# Patient Record
Sex: MALE | Race: WHITE | NOT HISPANIC OR LATINO | Employment: PART TIME | URBAN - METROPOLITAN AREA
[De-identification: names, ages, dates, MRNs, and addresses within clinical notes are randomized per-mention and may not be internally consistent; named-entity substitution may affect disease eponyms.]

---

## 2021-12-05 ENCOUNTER — APPOINTMENT (OUTPATIENT)
Dept: GENERAL RADIOLOGY | Facility: HOSPITAL | Age: 26
End: 2021-12-05

## 2021-12-05 ENCOUNTER — HOSPITAL ENCOUNTER (INPATIENT)
Facility: HOSPITAL | Age: 26
LOS: 4 days | Discharge: HOME | End: 2021-12-09
Attending: EMERGENCY MEDICINE | Admitting: INTERNAL MEDICINE

## 2021-12-05 DIAGNOSIS — R26.2 DIFFICULTY WALKING: ICD-10-CM

## 2021-12-05 DIAGNOSIS — J96.01 ACUTE RESPIRATORY FAILURE WITH HYPOXIA (HCC): Primary | ICD-10-CM

## 2021-12-05 DIAGNOSIS — Z78.9 DECREASED ACTIVITIES OF DAILY LIVING (ADL): ICD-10-CM

## 2021-12-05 DIAGNOSIS — J12.82 PNEUMONIA DUE TO COVID-19 VIRUS: ICD-10-CM

## 2021-12-05 DIAGNOSIS — U07.1 PNEUMONIA DUE TO COVID-19 VIRUS: ICD-10-CM

## 2021-12-05 LAB
ALBUMIN SERPL-MCNC: 4.1 G/DL (ref 3.5–5.2)
ALBUMIN/GLOB SERPL: 1.2 G/DL
ALP SERPL-CCNC: 86 U/L (ref 39–117)
ALT SERPL W P-5'-P-CCNC: 77 U/L (ref 1–41)
ANION GAP SERPL CALCULATED.3IONS-SCNC: 13.6 MMOL/L (ref 5–15)
AST SERPL-CCNC: 130 U/L (ref 1–40)
BASE EXCESS BLDA CALC-SCNC: 1 MMOL/L (ref -2–2)
BASOPHILS # BLD AUTO: 0 10*3/MM3 (ref 0–0.2)
BASOPHILS NFR BLD AUTO: 0 % (ref 0–1.5)
BDY SITE: ABNORMAL
BILIRUB SERPL-MCNC: 1 MG/DL (ref 0–1.2)
BUN SERPL-MCNC: 18 MG/DL (ref 6–20)
BUN/CREAT SERPL: 14.8 (ref 7–25)
CALCIUM SPEC-SCNC: 9.1 MG/DL (ref 8.6–10.5)
CHLORIDE SERPL-SCNC: 95 MMOL/L (ref 98–107)
CO2 SERPL-SCNC: 26.4 MMOL/L (ref 22–29)
COHGB MFR BLD: 0.5 % (ref 0–1.5)
CREAT SERPL-MCNC: 1.22 MG/DL (ref 0.76–1.27)
CRP SERPL-MCNC: 3.51 MG/DL (ref 0–0.5)
D DIMER PPP FEU-MCNC: 0.73 MG/L (FEU) (ref 0–0.59)
D-LACTATE SERPL-SCNC: 1.8 MMOL/L (ref 0.5–2)
D-LACTATE SERPL-SCNC: 2.4 MMOL/L (ref 0.5–2)
DEPRECATED RDW RBC AUTO: 37.7 FL (ref 37–54)
EOSINOPHIL # BLD AUTO: 0 10*3/MM3 (ref 0–0.4)
EOSINOPHIL NFR BLD AUTO: 0 % (ref 0.3–6.2)
ERYTHROCYTE [DISTWIDTH] IN BLOOD BY AUTOMATED COUNT: 12.2 % (ref 12.3–15.4)
FERRITIN SERPL-MCNC: 3118 NG/ML (ref 30–400)
FHHB: 8.1 % (ref 0–5)
GFR SERPL CREATININE-BSD FRML MDRD: 71 ML/MIN/1.73
GLOBULIN UR ELPH-MCNC: 3.5 GM/DL
GLUCOSE SERPL-MCNC: 128 MG/DL (ref 65–99)
HCO3 BLDA-SCNC: 23 MMOL/L (ref 22–26)
HCT VFR BLD AUTO: 43.6 % (ref 37.5–51)
HGB BLD-MCNC: 14.9 G/DL (ref 13–17.7)
HGB BLDA-MCNC: 14.2 G/DL (ref 13.8–16.4)
HOLD SPECIMEN: NORMAL
HOLD SPECIMEN: NORMAL
IMM GRANULOCYTES # BLD AUTO: 0.01 10*3/MM3 (ref 0–0.05)
IMM GRANULOCYTES NFR BLD AUTO: 0.3 % (ref 0–0.5)
LACTATE BLDA-SCNC: ABNORMAL MMOL/L
LDH SERPL-CCNC: 501 U/L (ref 135–225)
LYMPHOCYTES # BLD AUTO: 0.53 10*3/MM3 (ref 0.7–3.1)
LYMPHOCYTES NFR BLD AUTO: 14.2 % (ref 19.6–45.3)
MCH RBC QN AUTO: 28.9 PG (ref 26.6–33)
MCHC RBC AUTO-ENTMCNC: 34.2 G/DL (ref 31.5–35.7)
MCV RBC AUTO: 84.7 FL (ref 79–97)
METHGB BLD QL: 0.3 % (ref 0–1.5)
MODALITY: ABNORMAL
MONOCYTES # BLD AUTO: 0.3 10*3/MM3 (ref 0.1–0.9)
MONOCYTES NFR BLD AUTO: 8 % (ref 5–12)
NEUTROPHILS NFR BLD AUTO: 2.89 10*3/MM3 (ref 1.7–7)
NEUTROPHILS NFR BLD AUTO: 77.5 % (ref 42.7–76)
NRBC BLD AUTO-RTO: 0 /100 WBC (ref 0–0.2)
NT-PROBNP SERPL-MCNC: 72 PG/ML (ref 0–450)
OXYHGB MFR BLDV: 91.1 % (ref 94–99)
PCO2 BLDA: 29.5 MM HG (ref 35–45)
PH BLDA: 7.51 PH UNITS (ref 7.35–7.45)
PLATELET # BLD AUTO: 172 10*3/MM3 (ref 140–450)
PMV BLD AUTO: 10.2 FL (ref 6–12)
PO2 BLDA: 59 MM HG (ref 80–100)
POTASSIUM SERPL-SCNC: 3.7 MMOL/L (ref 3.5–5.2)
PROCALCITONIN SERPL-MCNC: 0.22 NG/ML (ref 0–0.25)
PROT SERPL-MCNC: 7.6 G/DL (ref 6–8.5)
RBC # BLD AUTO: 5.15 10*6/MM3 (ref 4.14–5.8)
SAO2 % BLDCOA: 91.8 % (ref 95–99)
SODIUM SERPL-SCNC: 135 MMOL/L (ref 136–145)
TROPONIN T SERPL-MCNC: <0.01 NG/ML (ref 0–0.03)
WBC NRBC COR # BLD: 3.73 10*3/MM3 (ref 3.4–10.8)
WHOLE BLOOD HOLD SPECIMEN: NORMAL
WHOLE BLOOD HOLD SPECIMEN: NORMAL

## 2021-12-05 PROCEDURE — 36415 COLL VENOUS BLD VENIPUNCTURE: CPT | Performed by: EMERGENCY MEDICINE

## 2021-12-05 PROCEDURE — 25010000002 ENOXAPARIN PER 10 MG: Performed by: INTERNAL MEDICINE

## 2021-12-05 PROCEDURE — 82375 ASSAY CARBOXYHB QUANT: CPT | Performed by: EMERGENCY MEDICINE

## 2021-12-05 PROCEDURE — 84484 ASSAY OF TROPONIN QUANT: CPT | Performed by: EMERGENCY MEDICINE

## 2021-12-05 PROCEDURE — 82805 BLOOD GASES W/O2 SATURATION: CPT | Performed by: EMERGENCY MEDICINE

## 2021-12-05 PROCEDURE — 36600 WITHDRAWAL OF ARTERIAL BLOOD: CPT | Performed by: EMERGENCY MEDICINE

## 2021-12-05 PROCEDURE — 83615 LACTATE (LD) (LDH) ENZYME: CPT | Performed by: EMERGENCY MEDICINE

## 2021-12-05 PROCEDURE — 83050 HGB METHEMOGLOBIN QUAN: CPT | Performed by: EMERGENCY MEDICINE

## 2021-12-05 PROCEDURE — 93010 ELECTROCARDIOGRAM REPORT: CPT | Performed by: INTERNAL MEDICINE

## 2021-12-05 PROCEDURE — 99284 EMERGENCY DEPT VISIT MOD MDM: CPT

## 2021-12-05 PROCEDURE — 85379 FIBRIN DEGRADATION QUANT: CPT | Performed by: EMERGENCY MEDICINE

## 2021-12-05 PROCEDURE — 85025 COMPLETE CBC W/AUTO DIFF WBC: CPT | Performed by: EMERGENCY MEDICINE

## 2021-12-05 PROCEDURE — 93005 ELECTROCARDIOGRAM TRACING: CPT | Performed by: EMERGENCY MEDICINE

## 2021-12-05 PROCEDURE — 82728 ASSAY OF FERRITIN: CPT | Performed by: EMERGENCY MEDICINE

## 2021-12-05 PROCEDURE — 83605 ASSAY OF LACTIC ACID: CPT | Performed by: EMERGENCY MEDICINE

## 2021-12-05 PROCEDURE — 87040 BLOOD CULTURE FOR BACTERIA: CPT | Performed by: EMERGENCY MEDICINE

## 2021-12-05 PROCEDURE — 80053 COMPREHEN METABOLIC PANEL: CPT | Performed by: EMERGENCY MEDICINE

## 2021-12-05 PROCEDURE — 99223 1ST HOSP IP/OBS HIGH 75: CPT | Performed by: INTERNAL MEDICINE

## 2021-12-05 PROCEDURE — XW033E5 INTRODUCTION OF REMDESIVIR ANTI-INFECTIVE INTO PERIPHERAL VEIN, PERCUTANEOUS APPROACH, NEW TECHNOLOGY GROUP 5: ICD-10-PCS | Performed by: STUDENT IN AN ORGANIZED HEALTH CARE EDUCATION/TRAINING PROGRAM

## 2021-12-05 PROCEDURE — 93005 ELECTROCARDIOGRAM TRACING: CPT

## 2021-12-05 PROCEDURE — 84145 PROCALCITONIN (PCT): CPT | Performed by: EMERGENCY MEDICINE

## 2021-12-05 PROCEDURE — 71045 X-RAY EXAM CHEST 1 VIEW: CPT

## 2021-12-05 PROCEDURE — 86140 C-REACTIVE PROTEIN: CPT | Performed by: EMERGENCY MEDICINE

## 2021-12-05 PROCEDURE — 83880 ASSAY OF NATRIURETIC PEPTIDE: CPT | Performed by: EMERGENCY MEDICINE

## 2021-12-05 RX ORDER — SODIUM CHLORIDE 0.9 % (FLUSH) 0.9 %
10 SYRINGE (ML) INJECTION EVERY 12 HOURS SCHEDULED
Status: DISCONTINUED | OUTPATIENT
Start: 2021-12-05 | End: 2021-12-09 | Stop reason: HOSPADM

## 2021-12-05 RX ORDER — DEXAMETHASONE 4 MG/1
6 TABLET ORAL DAILY
Status: DISCONTINUED | OUTPATIENT
Start: 2021-12-06 | End: 2021-12-08

## 2021-12-05 RX ORDER — FLUTICASONE PROPIONATE 50 MCG
2 SPRAY, SUSPENSION (ML) NASAL DAILY
COMMUNITY

## 2021-12-05 RX ORDER — NITROGLYCERIN 0.4 MG/1
0.4 TABLET SUBLINGUAL
Status: DISCONTINUED | OUTPATIENT
Start: 2021-12-05 | End: 2021-12-09 | Stop reason: HOSPADM

## 2021-12-05 RX ORDER — SODIUM CHLORIDE 0.9 % (FLUSH) 0.9 %
10 SYRINGE (ML) INJECTION AS NEEDED
Status: DISCONTINUED | OUTPATIENT
Start: 2021-12-05 | End: 2021-12-09 | Stop reason: HOSPADM

## 2021-12-05 RX ORDER — TOPIRAMATE 25 MG/1
25 TABLET ORAL NIGHTLY
COMMUNITY

## 2021-12-05 RX ORDER — ACETAMINOPHEN 325 MG/1
650 TABLET ORAL EVERY 4 HOURS PRN
Status: DISCONTINUED | OUTPATIENT
Start: 2021-12-05 | End: 2021-12-09 | Stop reason: HOSPADM

## 2021-12-05 RX ORDER — LISINOPRIL 10 MG/1
10 TABLET ORAL
Status: DISCONTINUED | OUTPATIENT
Start: 2021-12-06 | End: 2021-12-09 | Stop reason: HOSPADM

## 2021-12-05 RX ORDER — ONDANSETRON 2 MG/ML
4 INJECTION INTRAMUSCULAR; INTRAVENOUS EVERY 6 HOURS PRN
Status: DISCONTINUED | OUTPATIENT
Start: 2021-12-05 | End: 2021-12-09 | Stop reason: HOSPADM

## 2021-12-05 RX ORDER — ONDANSETRON 4 MG/1
4 TABLET, FILM COATED ORAL EVERY 6 HOURS PRN
Status: DISCONTINUED | OUTPATIENT
Start: 2021-12-05 | End: 2021-12-09 | Stop reason: HOSPADM

## 2021-12-05 RX ORDER — IPRATROPIUM BROMIDE AND ALBUTEROL SULFATE 2.5; .5 MG/3ML; MG/3ML
3 SOLUTION RESPIRATORY (INHALATION)
Status: DISCONTINUED | OUTPATIENT
Start: 2021-12-05 | End: 2021-12-06

## 2021-12-05 RX ORDER — LISINOPRIL 10 MG/1
10 TABLET ORAL NIGHTLY
COMMUNITY

## 2021-12-05 RX ORDER — METHYLPREDNISOLONE 4 MG/1
TABLET ORAL
COMMUNITY
Start: 2021-11-30 | End: 2021-12-09 | Stop reason: HOSPADM

## 2021-12-05 RX ORDER — BENZONATATE 200 MG/1
200 CAPSULE ORAL 3 TIMES DAILY PRN
COMMUNITY

## 2021-12-05 RX ORDER — DEXAMETHASONE SODIUM PHOSPHATE 10 MG/ML
6 INJECTION INTRAMUSCULAR; INTRAVENOUS DAILY
Status: DISCONTINUED | OUTPATIENT
Start: 2021-12-06 | End: 2021-12-09 | Stop reason: HOSPADM

## 2021-12-05 RX ORDER — AZITHROMYCIN 1 G
PACKET (EA) ORAL DAILY
Status: ON HOLD | COMMUNITY
Start: 2021-11-30 | End: 2021-12-06

## 2021-12-05 RX ADMIN — SODIUM CHLORIDE, PRESERVATIVE FREE 10 ML: 5 INJECTION INTRAVENOUS at 22:37

## 2021-12-05 RX ADMIN — ENOXAPARIN SODIUM 40 MG: 40 INJECTION SUBCUTANEOUS at 22:37

## 2021-12-05 RX ADMIN — REMDESIVIR 200 MG: 100 INJECTION, POWDER, LYOPHILIZED, FOR SOLUTION INTRAVENOUS at 22:38

## 2021-12-05 RX ADMIN — SODIUM CHLORIDE 1000 ML: 9 INJECTION, SOLUTION INTRAVENOUS at 18:15

## 2021-12-05 RX ADMIN — ACETAMINOPHEN 650 MG: 325 TABLET ORAL at 22:36

## 2021-12-06 ENCOUNTER — APPOINTMENT (OUTPATIENT)
Dept: CT IMAGING | Facility: HOSPITAL | Age: 26
End: 2021-12-06

## 2021-12-06 PROBLEM — R09.02 HYPOXIA: Status: ACTIVE | Noted: 2021-12-06

## 2021-12-06 PROBLEM — E66.9 OBESITY (BMI 30-39.9): Chronic | Status: ACTIVE | Noted: 2021-12-06

## 2021-12-06 LAB
ALBUMIN SERPL-MCNC: 3.4 G/DL (ref 3.5–5.2)
ALBUMIN/GLOB SERPL: 1 G/DL
ALP SERPL-CCNC: 82 U/L (ref 39–117)
ALT SERPL W P-5'-P-CCNC: 66 U/L (ref 1–41)
ANION GAP SERPL CALCULATED.3IONS-SCNC: 11.2 MMOL/L (ref 5–15)
AST SERPL-CCNC: 107 U/L (ref 1–40)
BILIRUB SERPL-MCNC: 0.9 MG/DL (ref 0–1.2)
BUN SERPL-MCNC: 15 MG/DL (ref 6–20)
BUN/CREAT SERPL: 17.4 (ref 7–25)
CALCIUM SPEC-SCNC: 8.6 MG/DL (ref 8.6–10.5)
CHLORIDE SERPL-SCNC: 99 MMOL/L (ref 98–107)
CO2 SERPL-SCNC: 22.8 MMOL/L (ref 22–29)
CREAT SERPL-MCNC: 0.86 MG/DL (ref 0.76–1.27)
DEPRECATED RDW RBC AUTO: 36.7 FL (ref 37–54)
ERYTHROCYTE [DISTWIDTH] IN BLOOD BY AUTOMATED COUNT: 12.2 % (ref 12.3–15.4)
GFR SERPL CREATININE-BSD FRML MDRD: 107 ML/MIN/1.73
GLOBULIN UR ELPH-MCNC: 3.4 GM/DL
GLUCOSE SERPL-MCNC: 108 MG/DL (ref 65–99)
HCT VFR BLD AUTO: 38.5 % (ref 37.5–51)
HGB BLD-MCNC: 13.6 G/DL (ref 13–17.7)
MCH RBC QN AUTO: 28.9 PG (ref 26.6–33)
MCHC RBC AUTO-ENTMCNC: 35.3 G/DL (ref 31.5–35.7)
MCV RBC AUTO: 81.7 FL (ref 79–97)
PLATELET # BLD AUTO: 161 10*3/MM3 (ref 140–450)
PMV BLD AUTO: 10.1 FL (ref 6–12)
POTASSIUM SERPL-SCNC: 3.5 MMOL/L (ref 3.5–5.2)
PROT SERPL-MCNC: 6.8 G/DL (ref 6–8.5)
RBC # BLD AUTO: 4.71 10*6/MM3 (ref 4.14–5.8)
SODIUM SERPL-SCNC: 133 MMOL/L (ref 136–145)
WBC NRBC COR # BLD: 3.87 10*3/MM3 (ref 3.4–10.8)

## 2021-12-06 PROCEDURE — 25010000002 ENOXAPARIN PER 10 MG: Performed by: INTERNAL MEDICINE

## 2021-12-06 PROCEDURE — 94640 AIRWAY INHALATION TREATMENT: CPT

## 2021-12-06 PROCEDURE — 71275 CT ANGIOGRAPHY CHEST: CPT

## 2021-12-06 PROCEDURE — 85027 COMPLETE CBC AUTOMATED: CPT | Performed by: INTERNAL MEDICINE

## 2021-12-06 PROCEDURE — 25010000002 DEXAMETHASONE PER 1 MG: Performed by: INTERNAL MEDICINE

## 2021-12-06 PROCEDURE — 94799 UNLISTED PULMONARY SVC/PX: CPT

## 2021-12-06 PROCEDURE — 99233 SBSQ HOSP IP/OBS HIGH 50: CPT | Performed by: INTERNAL MEDICINE

## 2021-12-06 PROCEDURE — 80053 COMPREHEN METABOLIC PANEL: CPT | Performed by: INTERNAL MEDICINE

## 2021-12-06 PROCEDURE — 0 IOPAMIDOL PER 1 ML: Performed by: INTERNAL MEDICINE

## 2021-12-06 RX ORDER — IPRATROPIUM BROMIDE AND ALBUTEROL SULFATE 2.5; .5 MG/3ML; MG/3ML
3 SOLUTION RESPIRATORY (INHALATION)
Status: DISCONTINUED | OUTPATIENT
Start: 2021-12-06 | End: 2021-12-07

## 2021-12-06 RX ORDER — BUDESONIDE AND FORMOTEROL FUMARATE DIHYDRATE 160; 4.5 UG/1; UG/1
2 AEROSOL RESPIRATORY (INHALATION)
Status: DISCONTINUED | OUTPATIENT
Start: 2021-12-06 | End: 2021-12-09 | Stop reason: HOSPADM

## 2021-12-06 RX ADMIN — SODIUM CHLORIDE, PRESERVATIVE FREE 10 ML: 5 INJECTION INTRAVENOUS at 09:50

## 2021-12-06 RX ADMIN — BUDESONIDE AND FORMOTEROL FUMARATE DIHYDRATE 2 PUFF: 160; 4.5 AEROSOL RESPIRATORY (INHALATION) at 21:35

## 2021-12-06 RX ADMIN — IOPAMIDOL 100 ML: 755 INJECTION, SOLUTION INTRAVENOUS at 14:11

## 2021-12-06 RX ADMIN — IPRATROPIUM BROMIDE AND ALBUTEROL SULFATE 3 ML: .5; 2.5 SOLUTION RESPIRATORY (INHALATION) at 21:35

## 2021-12-06 RX ADMIN — ENOXAPARIN SODIUM 40 MG: 40 INJECTION SUBCUTANEOUS at 20:31

## 2021-12-06 RX ADMIN — IPRATROPIUM BROMIDE AND ALBUTEROL SULFATE 3 ML: .5; 2.5 SOLUTION RESPIRATORY (INHALATION) at 06:55

## 2021-12-06 RX ADMIN — DEXAMETHASONE SODIUM PHOSPHATE 6 MG: 10 INJECTION INTRAMUSCULAR; INTRAVENOUS at 09:50

## 2021-12-06 RX ADMIN — REMDESIVIR 100 MG: 100 INJECTION, POWDER, LYOPHILIZED, FOR SOLUTION INTRAVENOUS at 14:47

## 2021-12-06 RX ADMIN — SODIUM CHLORIDE, PRESERVATIVE FREE 10 ML: 5 INJECTION INTRAVENOUS at 20:31

## 2021-12-06 RX ADMIN — IPRATROPIUM BROMIDE AND ALBUTEROL SULFATE 3 ML: .5; 2.5 SOLUTION RESPIRATORY (INHALATION) at 12:00

## 2021-12-06 RX ADMIN — BUDESONIDE AND FORMOTEROL FUMARATE DIHYDRATE 2 PUFF: 160; 4.5 AEROSOL RESPIRATORY (INHALATION) at 09:43

## 2021-12-06 RX ADMIN — LISINOPRIL 10 MG: 10 TABLET ORAL at 09:50

## 2021-12-06 NOTE — H&P
Patient Care Team:  Calvin Chirinos APRN as PCP - General (Family Medicine)    Chief complaint   Shortness of breath    Subjective     History of Present Illness  25-year-old man with hypertension and a positive Covid test on Tuesday presents with worsening symptoms over the last 8 days of shortness of breath, fevers, chills, nausea (no vomiting), cough, body ache, diarrhea, and change of taste/smell. Patient reports he started having symptoms last Saturday. On Tuesday he went to [facility] urgent [facility] where he tested positive for COVID-19 was given a prescription for azithromycin and a Medrol Dosepak. Patient is that since then he has progressively gotten worse. He is have increasing cough. Has been running fevers, decreased appetite and profound weakness.     In the ED:  T 100.3        RR 20   /82   O2 sat 88 on room air up to 96 with 2 L nasal cannula    Notable labs:  CMP largely unremarkable  CBC largely unremarkable    -  -Ferritin 3100  -CRP 3.51  -Lactic 2.4  -Procalcitonin 0.22  D-dimer 0.73    CXR:  Multifocal pneumonia      Review of Systems   Constitutional: Positive for appetite change, fatigue and fever. Negative for chills.   HENT: Negative for congestion, ear pain and sore throat.    Eyes: Negative for pain.   Respiratory: Positive for cough and shortness of breath. Negative for chest tightness.    Cardiovascular: Negative for chest pain.   Gastrointestinal: Positive for mild nausea and mild diarrhea.  Negative for abdominal pain and vomiting.   Genitourinary: Negative for flank pain and hematuria.   Musculoskeletal: Positive for body aches Negative for joint swelling.   Skin: Negative for pallor.   Neurological: Positive for weakness.  Positive for change in taste and smell.  Negative for seizures and headaches.   All other systems reviewed and are negative.    Past History:  Medical History: has a past medical history of Hypertension.   Surgical History:  has no past surgical history on file.   Family History: family history is not on file.   Social History: reports that he has never smoked. He does not have any smokeless tobacco history on file. He reports previous alcohol use. He reports previous drug use.    Medications Prior to Admission   Medication Sig Dispense Refill Last Dose   • fluticasone (FLONASE) 50 MCG/ACT nasal spray 1 spray into the nostril(s) as directed by provider Daily.   12/4/2021 at Unknown time   • lisinopril (PRINIVIL,ZESTRIL) 10 MG tablet Take 10 mg by mouth Every Night.   12/4/2021 at Unknown time   • sertraline (ZOLOFT) 100 MG tablet Take 100 mg by mouth Every Night.      • topiramate (TOPAMAX) 25 MG tablet Take 25 mg by mouth Every Night.   12/4/2021 at Unknown time   • azithromycin (ZITHROMAX) 600 MG tablet Take  by mouth Daily. Not sure of dose. Was taking once daily.   Unknown at Unknown time   • benzonatate (TESSALON) 100 MG capsule Take 100 mg by mouth 3 (Three) Times a Day As Needed for Cough.   Unknown at Unknown time   • methylPREDNISolone (MEDROL) 4 MG dose pack Take  by mouth 2 (Two) Times a Day. Take as directed on package instructions.   Unknown at Unknown time      Allergies: Patient has no known allergies.    Objective      Vital Signs  Temp:  [100.3 °F (37.9 °C)-102.8 °F (39.3 °C)] 102.8 °F (39.3 °C)  Heart Rate:  [104-129] 111  Resp:  [20-22] 22  BP: (108-129)/(64-82) 116/64    Physical Exam  General appearance - Patient appears well-developed and well-nourished.    In no distress.  Head - Normocephalic, atraumatic.  Pupils - Equal, round, reactive to light.  Extraocular muscles are intact.  Conjunctive is clear.  Nasal - Normal inspection.  No evidence of trauma or epistaxis.  Tympanic membranes - Gray, intact without erythema or retractions.  Oral mucosa - Pink and moist without lesions or erythema.  Uvula is midline.  Chest wall - Atraumatic.  Chest wall is nontender.  Neck - Supple.  Trachea was midline.  There is no  palpable lymphadenopathy or thyromegaly.  There are no meningeal signs  Lungs -mild tachypnea.  No accessory muscle usage is noted.  Coarse breath sounds throughout.  Heart -tachycardic rate but with a regular rhythm without any murmurs, clicks, or gallops.  Abdomen - Soft.  Bowel sounds are present.  There is no palpable tenderness.  There is no rebound, guarding, or rigidity.  There are no palpable masses.  There are no pulsatile masses.  Back - Spine is straight and midline.  There is no CVA tenderness.  Extremities - Intact x4 with full range of motion.  There is no palpable edema.  Pulses are intact x4 and equal.  Neurologic - Patient is awake, alert, and oriented x3.  Cranial nerves II through XII are grossly intact.  Motor and sensory functions grossly intact.  Cerebellar function was normal.  Integument - There are no rashes.  There are no petechia or purpura lesions noted.  There are no vesicular lesions noted.       Results Review:   I reviewed the patient's new clinical results.      Assessment/Plan       Pneumonia due to COVID-19 virus      Assessment & Plan  25-year-old man with hypertension and a positive Covid test on Tuesday presents with worsening symptoms over the last 8 days of shortness of breath found to be in hypoxic respiratory failure.    Acute hypoxic respiratory failure secondary to Covid pneumonia  -Sepsis present on admission (fever, tachycardia, tachypnea, lactic acid 2.4) secondary to Covid pneumonia  Covid pneumonia  Low suspicion for superimposed bacterial pneumonia (procalcitonin 0.22)  -Patient is unvaccinated  -Continue dexamethasone  -Continue duo nebs  -Continue O2 support as needed  -Remdesivir to be dosed by pharmacy (still seems to fall within the timeframe)  [ ] Monitor for need to treat with antibiotics  [ ] Assess ongoing need for further IV fluid resuscitation    Hypertension:  -Continue home lisinopril    DVT prophylaxis: Lovenox 40 mg  Diet: Regular  Code: Full  code  Dispo: Difficult to say with Covid; will need to be able to hold his O2 sats without O2 support    I discussed the patients findings and my recommendations with patient    Richie Zambrano MD  12/05/21  23:25 EST    Time: Greater than 45 minutes

## 2021-12-06 NOTE — PROGRESS NOTES
[facility]   Hospitalist Progress Note  Date: 2021  Patient Name: Mariano Benitez  : 1995  MRN: [id number]  Date of admission: 2021      Subjective   Subjective     Chief Complaint: Follow up for shortness of breath    Summary: 26 y/o M w HTN presented with worsening SOA. COVID + on . O2 sat 88% on room air, on 2L.     Interval Followup: NAEON. Remains on 2L NC. Feeling a little better with oxygen on. Mostly dyspneic with extertion and feels very tired. No muscle aches. NO productive cough. No sharp chest pain with inspiration. Discussed elevated d dimer test and check his chest for blood clots and he is agreeable.     Review of Systems  Constitutional:  No Fever, No Chills, +Fatigue  HEENT:  No Hearing Changes, No Visual Changes  Cardiovascular:  No Chest Pain, No Edema  Respiratory:  + dry Cough, +Dyspnea, +LITTLEJOHN, No Wheezing, No Hemoptysis  Gastrointestinal:  No Nausea, No Vomiting, No Diarrhea  Genitourinary:  No Dysuria, No Hesitancy  Musculoskeletal:  No Arthralgias, No Myalgias,  Neuro:  No Weakness, No Numbness,  Heme/Lymph:  No Bruising, No Bleeding  Endocrine: No Hyperglycemia, No Cold intolerance    Objective   Objective     Vitals:   Temp:  [98.9 °F (37.2 °C)-102.8 °F (39.3 °C)] 98.9 °F (37.2 °C)  Heart Rate:  [] 101  Resp:  [20-22] 20  BP: ()/(64-82) 96/64  Flow (L/min):  [2-4] 2  Physical Exam    Constitutional: WNWD, awake, alert, no acute distress   Eyes: Pupils equal and reactive, no conjunctival injection   HENT: NCAT, moist mucous membranes   Neck: Supple, trachea midline   Respiratory: Clear to auscultation bilaterally, nonlabored respirations    Cardiovascular: RRR, no murmurs, no pedal edema   Gastrointestinal: Positive bowel sounds, soft, nontender, nondistended   Musculoskeletal: No gross deformities, no clubbing or cyanosis to extremities   Neurologic: Oriented x 3, Cranial Nerves grossly intact speech clear   Skin: Warm and dry, no rashes     Result Review     Result Review:  I have personally reviewed the results from the time of this admission to 12/6/2021 07:18 EST and agree with these findings:  [x]  Laboratory ALT 77, . Ferritin 3,118. D Dimer 0.73  []  Microbiology  []  Radiology  [x]  EKG/Telemetry sinus tachycardia  []  Cardiology/Vascular   []  Pathology  []  Old records  []  Other:    Assessment/Plan   Assessment / Plan     Assessment:  Active Hospital Problems    Diagnosis  POA   • **Pneumonia due to COVID-19 virus [U07.1, J12.82]  Yes   • Hypoxia [R09.02]  Yes   • Obesity (BMI 30-39.9) [E66.9]  Yes   • Hypertension [I10]  Yes   • Elevated d-dimer [R79.89]  Yes   • Transaminitis [R74.01]  Yes     Plan:    Continue enhanced airborne isolation  Check CTA of the chest for PE  Symbicort 2 puffs daily  Continue Decadron 6 mg daily  Continue IV remdesivir, day 2.  Daily CMP to monitor creatinine and liver enzymes  Wean supplemental oxygen, SPO2 goal greater than 90%  BP well controlled -> continue lisinopril 10 mg daily  Lovenox for DVT prophylaxis  Activity ad chalino.  A.m. labs ordered    Discussed plan with RN.    DVT prophylaxis:  Medical DVT prophylaxis orders are present.    CODE STATUS:   Code Status (Patient has no pulse and is not breathing): CPR (Attempt to Resuscitate)  Medical Interventions (Patient has pulse or is breathing): Full Support        Electronically signed by Tejas Diggs DO, 12/06/21, 7:18 AM EST.

## 2021-12-06 NOTE — ED PROVIDER NOTES
Time: 7:29 PM EST  Arrived by: Private vehicle  Chief Complaint: Shortness of air  History provided by: Patient  History is limited by: N/A     History of Present Illness:  Patient is a 25 y.o.  male that presents to the emergency department with increasing shortness of breath. Patient is he is positive for COVID-19. Patient reports he started having symptoms last Saturday. On Tuesday he went to [facility] urgent [facility] where he tested positive for COVID-19 was given a prescription for azithromycin and a Medrol Dosepak. Patient is that since then he has progressively gotten worse. He is have increasing cough. Has been running fevers, decreased appetite and profound weakness. His sister-in-law reported that his brother is also positive for COVID-19 and currently hospitalized. She also reports that at home his O2 saturation dropped down to 83% with ambulation. Thus she encouraged him to come to the ER today.    HPI    Patient Care Team  Primary Care Provider: Calvin Chirinos APRN    Past Medical History:     No Known Allergies  Past Medical History:   Diagnosis Date   • Hypertension      History reviewed. No pertinent surgical history.  History reviewed. No pertinent family history.    Home Medications:  Prior to Admission medications    Not on File        Social History:   Social History     Tobacco Use   • Smoking status: Never Smoker   • Smokeless tobacco: Not on file   Substance Use Topics   • Alcohol use: Not Currently   • Drug use: Not Currently       Review of Systems:  Review of Systems   Constitutional: Positive for appetite change, fatigue and fever. Negative for chills.   HENT: Negative for congestion, ear pain and sore throat.    Eyes: Negative for pain.   Respiratory: Positive for cough and shortness of breath. Negative for chest tightness.    Cardiovascular: Negative for chest pain.   Gastrointestinal: Negative for abdominal pain, diarrhea, nausea and vomiting.   Genitourinary: Negative  "for flank pain and hematuria.   Musculoskeletal: Negative for joint swelling.   Skin: Negative for pallor.   Neurological: Positive for weakness. Negative for seizures and headaches.   All other systems reviewed and are negative.       Physical Exam:   /82   Pulse 110   Temp 100.3 °F (37.9 °C) (Oral)   Resp 20   Ht 182.9 cm (72\")   Wt 102 kg (224 lb 3.3 oz)   SpO2 94%   BMI 30.41 kg/m²     Physical Exam       Vital signs were reviewed under triage note.  General appearance - Patient appears well-developed and well-nourished.  Patient is in mild acute respiratory distress. Patient is toxic appearing  Head - Normocephalic, atraumatic.  Pupils - Equal, round, reactive to light.  Extraocular muscles are intact.  Conjunctive is clear.  Nasal - Normal inspection.  No evidence of trauma or epistaxis.  Tympanic membranes - Gray, intact without erythema or retractions.  Oral mucosa - Pink and moist without lesions or erythema.  Uvula is midline.  Chest wall - Atraumatic.  Chest wall is nontender.  There is no vesicular rashes noted.  Neck - Supple.  Trachea was midline.  There is no palpable lymphadenopathy or thyromegaly.  There are no meningeal signs  Lungs -mild tachypnea. Mild accessory muscle usage is noted. Patient has moderate diffuse crackles.  Heart -tachycardic rate but with a regular rhythm without any murmurs, clicks, or gallops.  Abdomen - Soft.  Bowel sounds are present.  There is no palpable tenderness.  There is no rebound, guarding, or rigidity.  There are no palpable masses.  There are no pulsatile masses.  Back - Spine is straight and midline.  There is no CVA tenderness.  Extremities - Intact x4 with full range of motion.  There is no palpable edema.  Pulses are intact x4 and equal.  Neurologic - Patient is awake, alert, and oriented x3.  Cranial nerves II through XII are grossly intact.  Motor and sensory functions grossly intact.  Cerebellar function was normal.  Integument - There are no " rashes.  There are no petechia or purpura lesions noted.  There are no vesicular lesions noted.      Medications in the Emergency Department:  Medications   sodium chloride 0.9 % flush 10 mL (has no administration in time range)   sodium chloride 0.9 % bolus 1,000 mL (1,000 mL Intravenous New Bag 12/5/21 1815)        Labs  Lab Results (last 24 hours)     Procedure Component Value Units Date/Time    CBC & Differential [[id number]]  (Abnormal) Collected: 12/05/21 1653    Specimen: Blood from Arm, Right Updated: 12/05/21 1703    Narrative:      The following orders were created for panel order CBC & Differential.  Procedure                               Abnormality         Status                     ---------                               -----------         ------                     CBC Auto Differential[[id number]]        Abnormal            Final result                 Please view results for these tests on the individual orders.    Comprehensive Metabolic Panel [[id number]]  (Abnormal) Collected: 12/05/21 1653    Specimen: Blood from Arm, Right Updated: 12/05/21 1733     Glucose 128 mg/dL      BUN 18 mg/dL      Creatinine 1.22 mg/dL      Sodium 135 mmol/L      Potassium 3.7 mmol/L      Chloride 95 mmol/L      CO2 26.4 mmol/L      Calcium 9.1 mg/dL      Total Protein 7.6 g/dL      Albumin 4.10 g/dL      ALT (SGPT) 77 U/L      AST (SGOT) 130 U/L      Alkaline Phosphatase 86 U/L      Total Bilirubin 1.0 mg/dL      eGFR Non African Amer 71 mL/min/1.73      Globulin 3.5 gm/dL      A/G Ratio 1.2 g/dL      BUN/Creatinine Ratio 14.8     Anion Gap 13.6 mmol/L     Narrative:      GFR Normal >60  Chronic Kidney Disease <60  Kidney Failure <15      BNP [[id number]]  (Normal) Collected: 12/05/21 1653    Specimen: Blood from Arm, Right Updated: 12/05/21 1730     proBNP 72.0 pg/mL     Narrative:      Among patients with dyspnea, NT-proBNP is highly sensitive for the detection of acute congestive heart failure. In addition NT-proBNP  of <300 pg/ml effectively rules out acute congestive heart failure with 99% negative predictive value.    Results may be falsely decreased if patient taking Biotin.      Troponin [[id number]]  (Normal) Collected: 12/05/21 1653    Specimen: Blood from Arm, Right Updated: 12/05/21 1733     Troponin T <0.010 ng/mL     Narrative:      Troponin T Reference Range:  <= 0.03 ng/mL-   Negative for AMI  >0.03 ng/mL-     Abnormal for myocardial necrosis.  Clinicians would have to utilize clinical acumen, EKG, Troponin and serial changes to determine if it is an Acute Myocardial Infarction or myocardial injury due to an underlying chronic condition.       Results may be falsely decreased if patient taking Biotin.      CBC Auto Differential [[id number]]  (Abnormal) Collected: 12/05/21 1653    Specimen: Blood from Arm, Right Updated: 12/05/21 1703     WBC 3.73 10*3/mm3      RBC 5.15 10*6/mm3      Hemoglobin 14.9 g/dL      Hematocrit 43.6 %      MCV 84.7 fL      MCH 28.9 pg      MCHC 34.2 g/dL      RDW 12.2 %      RDW-SD 37.7 fl      MPV 10.2 fL      Platelets 172 10*3/mm3      Neutrophil % 77.5 %      Lymphocyte % 14.2 %      Monocyte % 8.0 %      Eosinophil % 0.0 %      Basophil % 0.0 %      Immature Grans % 0.3 %      Neutrophils, Absolute 2.89 10*3/mm3      Lymphocytes, Absolute 0.53 10*3/mm3      Monocytes, Absolute 0.30 10*3/mm3      Eosinophils, Absolute 0.00 10*3/mm3      Basophils, Absolute 0.00 10*3/mm3      Immature Grans, Absolute 0.01 10*3/mm3      nRBC 0.0 /100 WBC     Lactate Dehydrogenase [[id number]]  (Abnormal) Collected: 12/05/21 1653    Specimen: Blood from Arm, Right Updated: 12/05/21 1854      U/L     Procalcitonin [[id number]] Collected: 12/05/21 1653    Specimen: Blood from Arm, Right Updated: 12/05/21 1836    D-dimer, Quantitative [[id number]]  (Abnormal) Collected: 12/05/21 1653    Specimen: Blood from Arm, Right Updated: 12/05/21 1835     D-Dimer, Quantitative 0.73 mg/L (FEU)     Narrative:       Effective 6/30/09 new normal reference range: <= 0.59 mg/L FEU  Increases in D-dimer concentration observed with  thromboembolic events can be variable due to localization,  size and age of the thrombus. Therefore, a thromboembolic  event cannot be diagnosed with certainty on the basis of the  reference range.  D-dimers may also be elevated for a variety of disorders   including: advanced age, pregnancy, coronary disease, cancer,  liver disease, infection, inflammation, hematoma, DIC, trauma,  post surgery, diabetes, thrombolytic therapy, stress, and  general hospitalization. D-dimer levels may be decreased in  patients on anticoagulant therapy.    C-reactive Protein [[id number]]  (Abnormal) Collected: 12/05/21 1653    Specimen: Blood from Arm, Right Updated: 12/05/21 1854     C-Reactive Protein 3.51 mg/dL     Ferritin [[id number]]  (Abnormal) Collected: 12/05/21 1653    Specimen: Blood from Arm, Right Updated: 12/05/21 1922     Ferritin 3,118.00 ng/mL     Narrative:      <12 ng/mL usually associated with Iron Deficiency Anemia. Above normal range levels may be due to Hepatic and/or Chronic Inflammatory Disease.  Results may be falsely decreased if patient taking Biotin.      Lactic Acid, Plasma [[id number]]  (Abnormal) Collected: 12/05/21 1811    Specimen: Blood Updated: 12/05/21 1855     Lactate 2.4 mmol/L     Blood Gas, Arterial -With Co-Ox Panel: Yes [[id number]]  (Abnormal) Collected: 12/05/21 1833    Specimen: Arterial Blood Updated: 12/05/21 1850     pH, Arterial 7.509 pH units      pCO2, Arterial 29.5 mm Hg      pO2, Arterial 59.0 mm Hg      HCO3, Arterial 23.0 mmol/L      Base Excess, Arterial 1.0 mmol/L      O2 Saturation, Arterial 91.8 %      Hemoglobin, Blood Gas 14.2 g/dL      Carboxyhemoglobin 0.5 %      Methemoglobin 0.30 %      Oxyhemoglobin 91.1 %      FHHB 8.1 %      Site Arterial: right radial     Modality Room Air     Lactate, Arterial --           Imaging:  XR Chest 1 View    Result Date:  12/5/2021  PROCEDURE: XR CHEST 1 VW  COMPARISON: None  INDICATIONS: SOA Triage Protocol  FINDINGS:  The heart size is borderline enlarged.  There are patchy airspace opacities throughout the bilateral midlung and lower lung zones greater on the right than the left.  There is also right upper lobe infiltrate.  CONCLUSION: Multifocal pneumonia       MANUELA DE LOS SANTOS MD       Electronically Signed and Approved By: MANUELA DE LOS SANTOS MD on 12/05/2021 at 17:17                EKG:      Procedures:  Procedures    Progress                      Patient was seen evaluated ED by me. The above history and physical examination was performed as document. The diagnostic data was obtained peer results reviewed. Discussed with the patient. Patient was given a liter of IV fluids. Sepsis fluids were held due to the COVID-19 diagnosis. Patient is already on steroids and thus was not given a dose of Decadron in the ED. Patient was placed on supplemental O2 to maintain saturations greater than 90%. Hospital service was consulted for admission. Patient is aware this treatment plan agreeable to this.      Medical Decision Making:  [facility]     Final diagnoses:   Pneumonia due to COVID-19 virus   Acute respiratory failure with hypoxia (HCC)        Disposition:  ED Disposition     ED Disposition Condition Comment    Decision to Admit            Documentation assistance provided by Tony Multani DO acting as scribe for Dr. Tony Multani DO. Information recorded by the scribe was done at my direction and has been verified and validated by me.      Tony Multani DO  12/05/21 1934

## 2021-12-07 LAB
ALBUMIN SERPL-MCNC: 3.7 G/DL (ref 3.5–5.2)
ALBUMIN/GLOB SERPL: 1 G/DL
ALP SERPL-CCNC: 84 U/L (ref 39–117)
ALT SERPL W P-5'-P-CCNC: 64 U/L (ref 1–41)
ANION GAP SERPL CALCULATED.3IONS-SCNC: 11.9 MMOL/L (ref 5–15)
AST SERPL-CCNC: 77 U/L (ref 1–40)
BILIRUB SERPL-MCNC: 0.6 MG/DL (ref 0–1.2)
BUN SERPL-MCNC: 20 MG/DL (ref 6–20)
BUN/CREAT SERPL: 20.2 (ref 7–25)
CALCIUM SPEC-SCNC: 9.3 MG/DL (ref 8.6–10.5)
CHLORIDE SERPL-SCNC: 103 MMOL/L (ref 98–107)
CO2 SERPL-SCNC: 24.1 MMOL/L (ref 22–29)
CREAT SERPL-MCNC: 0.99 MG/DL (ref 0.76–1.27)
D DIMER PPP FEU-MCNC: 0.63 MG/L (FEU) (ref 0–0.59)
DEPRECATED RDW RBC AUTO: 37.7 FL (ref 37–54)
ERYTHROCYTE [DISTWIDTH] IN BLOOD BY AUTOMATED COUNT: 12.3 % (ref 12.3–15.4)
GFR SERPL CREATININE-BSD FRML MDRD: 91 ML/MIN/1.73
GLOBULIN UR ELPH-MCNC: 3.6 GM/DL
GLUCOSE SERPL-MCNC: 114 MG/DL (ref 65–99)
HCT VFR BLD AUTO: 42.1 % (ref 37.5–51)
HGB BLD-MCNC: 14.3 G/DL (ref 13–17.7)
MCH RBC QN AUTO: 28.6 PG (ref 26.6–33)
MCHC RBC AUTO-ENTMCNC: 34 G/DL (ref 31.5–35.7)
MCV RBC AUTO: 84.2 FL (ref 79–97)
PLATELET # BLD AUTO: 189 10*3/MM3 (ref 140–450)
PMV BLD AUTO: 10.5 FL (ref 6–12)
POTASSIUM SERPL-SCNC: 4.2 MMOL/L (ref 3.5–5.2)
PROT SERPL-MCNC: 7.3 G/DL (ref 6–8.5)
RBC # BLD AUTO: 5 10*6/MM3 (ref 4.14–5.8)
SODIUM SERPL-SCNC: 139 MMOL/L (ref 136–145)
WBC NRBC COR # BLD: 4.77 10*3/MM3 (ref 3.4–10.8)

## 2021-12-07 PROCEDURE — 85379 FIBRIN DEGRADATION QUANT: CPT | Performed by: INTERNAL MEDICINE

## 2021-12-07 PROCEDURE — 94799 UNLISTED PULMONARY SVC/PX: CPT

## 2021-12-07 PROCEDURE — 97165 OT EVAL LOW COMPLEX 30 MIN: CPT

## 2021-12-07 PROCEDURE — 80053 COMPREHEN METABOLIC PANEL: CPT | Performed by: INTERNAL MEDICINE

## 2021-12-07 PROCEDURE — 97161 PT EVAL LOW COMPLEX 20 MIN: CPT

## 2021-12-07 PROCEDURE — 25010000002 DEXAMETHASONE PER 1 MG: Performed by: INTERNAL MEDICINE

## 2021-12-07 PROCEDURE — 25010000002 ENOXAPARIN PER 10 MG: Performed by: INTERNAL MEDICINE

## 2021-12-07 PROCEDURE — 85027 COMPLETE CBC AUTOMATED: CPT | Performed by: INTERNAL MEDICINE

## 2021-12-07 PROCEDURE — 99233 SBSQ HOSP IP/OBS HIGH 50: CPT | Performed by: STUDENT IN AN ORGANIZED HEALTH CARE EDUCATION/TRAINING PROGRAM

## 2021-12-07 RX ORDER — IPRATROPIUM BROMIDE AND ALBUTEROL SULFATE 2.5; .5 MG/3ML; MG/3ML
3 SOLUTION RESPIRATORY (INHALATION) EVERY 6 HOURS PRN
Status: DISCONTINUED | OUTPATIENT
Start: 2021-12-07 | End: 2021-12-07

## 2021-12-07 RX ORDER — IPRATROPIUM BROMIDE AND ALBUTEROL SULFATE 2.5; .5 MG/3ML; MG/3ML
3 SOLUTION RESPIRATORY (INHALATION)
Status: DISCONTINUED | OUTPATIENT
Start: 2021-12-07 | End: 2021-12-08

## 2021-12-07 RX ADMIN — ENOXAPARIN SODIUM 40 MG: 40 INJECTION SUBCUTANEOUS at 21:03

## 2021-12-07 RX ADMIN — IPRATROPIUM BROMIDE AND ALBUTEROL SULFATE 3 ML: .5; 2.5 SOLUTION RESPIRATORY (INHALATION) at 23:55

## 2021-12-07 RX ADMIN — SODIUM CHLORIDE, PRESERVATIVE FREE 10 ML: 5 INJECTION INTRAVENOUS at 08:38

## 2021-12-07 RX ADMIN — BUDESONIDE AND FORMOTEROL FUMARATE DIHYDRATE 2 PUFF: 160; 4.5 AEROSOL RESPIRATORY (INHALATION) at 18:48

## 2021-12-07 RX ADMIN — BUDESONIDE AND FORMOTEROL FUMARATE DIHYDRATE 2 PUFF: 160; 4.5 AEROSOL RESPIRATORY (INHALATION) at 06:52

## 2021-12-07 RX ADMIN — LISINOPRIL 10 MG: 10 TABLET ORAL at 08:38

## 2021-12-07 RX ADMIN — IPRATROPIUM BROMIDE AND ALBUTEROL SULFATE 3 ML: .5; 2.5 SOLUTION RESPIRATORY (INHALATION) at 18:48

## 2021-12-07 RX ADMIN — IPRATROPIUM BROMIDE AND ALBUTEROL SULFATE 3 ML: .5; 2.5 SOLUTION RESPIRATORY (INHALATION) at 15:26

## 2021-12-07 RX ADMIN — DEXAMETHASONE SODIUM PHOSPHATE 6 MG: 10 INJECTION INTRAMUSCULAR; INTRAVENOUS at 08:38

## 2021-12-07 RX ADMIN — IPRATROPIUM BROMIDE AND ALBUTEROL SULFATE 3 ML: .5; 2.5 SOLUTION RESPIRATORY (INHALATION) at 06:52

## 2021-12-07 RX ADMIN — REMDESIVIR 100 MG: 100 INJECTION, POWDER, LYOPHILIZED, FOR SOLUTION INTRAVENOUS at 17:29

## 2021-12-07 RX ADMIN — SODIUM CHLORIDE, PRESERVATIVE FREE 10 ML: 5 INJECTION INTRAVENOUS at 21:03

## 2021-12-07 NOTE — THERAPY EVALUATION
Patient Name: Mariano Benitez  : 1995    MRN: [id number]                              Today's Date: 2021       Admit Date: 2021    Visit Dx:     ICD-10-CM ICD-9-CM   1. Pneumonia due to COVID-19 virus  U07.1 480.8    J12.82 079.89   2. Acute respiratory failure with hypoxia (HCC)  J96.01 518.81   3. Decreased activities of daily living (ADL)  Z78.9 V49.89     Patient Active Problem List   Diagnosis   • Pneumonia due to COVID-19 virus   • Hypoxia   • Obesity (BMI 30-39.9)   • Hypertension   • Elevated d-dimer   • Transaminitis     Past Medical History:   Diagnosis Date   • Hypertension      History reviewed. No pertinent surgical history.   General Information     Row Name 21 0854          OT Time and Intention    Document Type evaluation  -PG     Mode of Treatment individual therapy; occupational therapy  -PG     Row Name 21 0854          General Information    Patient Profile Reviewed yes  -PG     Prior Level of Function independent:; ADL's; transfer  -PG     Existing Precautions/Restrictions oxygen therapy device and L/min  -PG     Barriers to Rehab none identified  -PG     Row Name 21 0854          Occupational Profile    Reason for Services/Referral (Occupational Profile) Decreased ADL performance  -PG     Row Name 21 0854          Living Environment    Lives With other relative(s)  -PG     Row Name 2154          Cognition    Orientation Status (Cognition) oriented x 3  -PG     Row Name 21 0854          Safety Issues, Functional Mobility    Safety Issues Affecting Function (Mobility) ability to follow commands  -PG     Comment, Safety Issues/Impairments (Mobility) But applicable  -PG           User Key  (r) = Recorded By, (t) = Taken By, (c) = Cosigned By    Initials Name Provider Type    PG Tom White, OT Occupational Therapist                 Mobility/ADL's     Row Name 21 0837          Transfers    Comment (Transfers) Independent with functional  transfers  -PG     Row Name 12/07/21 0854          Activities of Daily Living    BADL Assessment/Intervention --  Independent with all self-care activities with motivation needed  -PG           User Key  (r) = Recorded By, (t) = Taken By, (c) = Cosigned By    Initials Name Provider Type    PG Tom White OT Occupational Therapist               Obj/Interventions     Row Name 12/07/21 0855          Sensory Assessment (Somatosensory)    Sensory Assessment (Somatosensory) sensation intact  -PG     Row Name 12/07/21 0855          Vision Assessment/Intervention    Visual Impairment/Limitations WFL  -PG     Row Name 12/07/21 0855          Range of Motion Comprehensive    General Range of Motion no range of motion deficits identified  -PG     Row Name 12/07/21 0855          Strength Comprehensive (MMT)    Comment, General Manual Muscle Testing (MMT) Assessment 5/5 bilateral upper extremity strength  -PG     Row Name 12/07/21 0855          Motor Skills    Motor Skills coordination; functional endurance  -PG     Coordination WFL  -PG     Functional Endurance Fair minus  -PG           User Key  (r) = Recorded By, (t) = Taken By, (c) = Cosigned By    Initials Name Provider Type    PG Tom White OT Occupational Therapist               Goals/Plan    No documentation.                Clinical Impression     Row Name 12/07/21 0855          Pain Assessment    Additional Documentation Pain Scale: Numbers Pre/Post-Treatment (Group)  -PG     Row Name 12/07/21 0855          Pain Scale: Numbers Pre/Post-Treatment    Pretreatment Pain Rating 0/10 - no pain  -PG     Posttreatment Pain Rating 0/10 - no pain  -PG     Row Name 12/07/21 0855          Plan of Care Review    Plan of Care Reviewed With patient  -PG     Progress improving  -PG     Outcome Summary Patient currently at a independent level with all self-care and functional transfers with motivation needed to get out of bed,  walk, and sit up in the chair.  At this time, no  additional occupational therapy is recommended.  -PG     Row Name 12/07/21 0855          Therapy Assessment/Plan (OT)    Patient/Family Therapy Goal Statement (OT) She would like to return home independently  -PG     Rehab Potential (OT) good, to achieve stated therapy goals  -PG     Criteria for Skilled Therapeutic Interventions Met (OT) no; does not meet criteria for skilled intervention  -PG     Therapy Frequency (OT) evaluation only  -PG     Row Name 12/07/21 0855          Therapy Plan Review/Discharge Plan (OT)    Anticipated Discharge Disposition (OT) home  -PG           User Key  (r) = Recorded By, (t) = Taken By, (c) = Cosigned By    Initials Name Provider Type    PG Tom White, OT Occupational Therapist               Outcome Measures     Row Name 12/07/21 0857          How much help from another is currently needed...    Putting on and taking off regular lower body clothing? 4  -PG     Bathing (including washing, rinsing, and drying) 4  -PG     Toileting (which includes using toilet bed pan or urinal) 4  -PG     Putting on and taking off regular upper body clothing 4  -PG     Taking care of personal grooming (such as brushing teeth) 4  -PG     Eating meals 4  -PG     AM-PAC 6 Clicks Score (OT) 24  -PG     Row Name 12/07/21 0857          Functional Assessment    Outcome Measure Options AM-PAC 6 Clicks Daily Activity (OT); Optimal Instrument  -PG     Row Name 12/07/21 0857          Optimal Instrument    Optimal Instrument Optimal - 3  -PG     Bending/Stooping 2  -PG     Standing 1  -PG     Reaching 1  -PG     From the list, choose the 3 activities you would most like to be able to do without any difficulty Bending/stooping; Standing; Reaching  -PG     Total Score Optimal - 3 4  -PG           User Key  (r) = Recorded By, (t) = Taken By, (c) = Cosigned By    Initials Name Provider Type    PG Tom White, OT Occupational Therapist                  OT Recommendation and Plan  Therapy Frequency (OT):  evaluation only  Plan of Care Review  Plan of Care Reviewed With: patient  Progress: improving  Outcome Summary: Patient currently at a independent level with all self-care and functional transfers with motivation needed to get out of bed,  walk, and sit up in the chair.  At this time, no additional occupational therapy is recommended.     Time Calculation:    Time Calculation- OT     Row Name 12/07/21 0858             Time Calculation- OT    OT Received On 12/07/21  -PG              Untimed Charges    OT Eval/Re-eval Minutes 40  -PG              Total Minutes    Untimed Charges Total Minutes 40  -PG       Total Minutes 40  -PG            User Key  (r) = Recorded By, (t) = Taken By, (c) = Cosigned By    Initials Name Provider Type    PG Tom White OT Occupational Therapist              Therapy Charges for Today     Code Description Service Date Service Provider Modifiers Qty    15998659814 HC OT EVAL LOW COMPLEXITY 3 12/7/2021 Tom White OT GO 1               Tom White OT  12/7/2021

## 2021-12-07 NOTE — THERAPY EVALUATION
Acute Care - Physical Therapy Initial Evaluation   [facility]     Patient Name: Mariano Benitez  : 1995  MRN: [id number]  Today's Date: 2021      Visit Dx:     ICD-10-CM ICD-9-CM   1. Pneumonia due to COVID-19 virus  U07.1 480.8    J12.82 079.89   2. Acute respiratory failure with hypoxia (HCC)  J96.01 518.81   3. Decreased activities of daily living (ADL)  Z78.9 V49.89   4. Difficulty walking  R26.2 719.7     Patient Active Problem List   Diagnosis   • Pneumonia due to COVID-19 virus   • Hypoxia   • Obesity (BMI 30-39.9)   • Hypertension   • Elevated d-dimer   • Transaminitis     Past Medical History:   Diagnosis Date   • Hypertension      History reviewed. No pertinent surgical history.  PT Assessment (last 12 hours)     PT Evaluation and Treatment     Row Name 21 1600          Physical Therapy Time and Intention    Subjective Information no complaints  -CS     Document Type evaluation  -CS     Mode of Treatment individual therapy; physical therapy  -CS     Patient Effort adequate  -CS     Symptoms Noted During/After Treatment fatigue; shortness of breath  -CS     Row Name 21 1600          General Information    Patient Profile Reviewed yes  -CS     Patient Observations alert; cooperative  -CS     Prior Level of Function independent:; all household mobility; community mobility; gait; transfer; bed mobility; ADL's  -CS     Equipment Currently Used at Home none  -CS     Existing Precautions/Restrictions oxygen therapy device and L/min  -CS     Barriers to Rehab none identified  -CS     Row Name 21 1600          Living Environment    Current Living Arrangements home/apartment/condo  -CS     Lives With other (see comments)  roommate  -CS     Row Name 21 1600          Home Use of Assistive/Adaptive Equipment    Equipment Currently Used at Home none  -CS     Row Name 21 1600          Cognition    Orientation Status (Cognition) oriented x 3  -CS     Row Name 21 1600           Range of Motion Comprehensive    General Range of Motion bilateral lower extremity ROM WFL  -     Row Name 12/07/21 1600          Strength Comprehensive (MMT)    General Manual Muscle Testing (MMT) Assessment no strength deficits identified  -     Row Name 12/07/21 1600          Bed Mobility    Bed Mobility bed mobility (all) activities  -CS     All Activities, [place] (Bed Mobility) independent  -     Row Name 12/07/21 1600          Transfers    Transfers sit-stand transfer; stand-sit transfer  -CS     Comment (Transfers) Patient was observed completing all functional transfers independently  -     Row Name 12/07/21 1600          Gait/Stairs (Locomotion)    Gait/Stairs Locomotion gait/ambulation independence  -     [place] Level (Gait) independent  -CS     Assistive Device (Gait) --  no AD  -CS     Distance in Feet (Gait) 15  -CS     Comment (Gait/Stairs) Patient observed ambulating in room without assistive device independently.  -     Row Name 12/07/21 1600          Safety Issues, Functional Mobility    Safety Issues Affecting Function (Mobility) --  n/a  -CS     Impairments Affecting Function (Mobility) other (see comments)  n/a  -     Row Name 12/07/21 1600          Balance    Balance Assessment standing dynamic balance  -     Dynamic Standing Balance WFL  -     Row Name 12/07/21 1600          Plan of Care Review    Plan of Care Reviewed With patient  -CS     Progress improving  -     Outcome Summary Patient presents with no physical limitations that impair his ability to return home independently or with assist from family as needed.  Patient was encouraged to continue ambulating in the room every couple hours while here at the hospital.  Patient will be discharged from skilled physical therapy services at this time.  -     Row Name 12/07/21 1600          Therapy Assessment/Plan (PT)    Criteria for Skilled Interventions Met (PT) no problems identified which require skilled  intervention  -CS     Row Name 12/07/21 1600          PT Evaluation Complexity    History, PT Evaluation Complexity no personal factors and/or comorbidities  -CS     Examination of Body Systems (PT Eval Complexity) total of 4 or more elements  -CS     Clinical Presentation (PT Evaluation Complexity) stable  -CS     Clinical Decision Making (PT Evaluation Complexity) low complexity  -CS     Overall Complexity (PT Evaluation Complexity) low complexity  -CS     Row Name 12/07/21 1600          Therapy Plan Review/Discharge Plan (PT)    Therapy Plan Review (PT) patient  -CS           User Key  (r) = Recorded By, (t) = Taken By, (c) = Cosigned By    Initials Name Provider Type    CS Andreia Rouse, PT Physical Therapist                  PT Recommendation and Plan  Anticipated Discharge Disposition (PT): home, home with outpatient therapy services (pulmonary rehab services if needed)  Therapy Frequency (PT): evaluation only  Plan of Care Reviewed With: patient  Progress: improving  Outcome Summary: Patient presents with no physical limitations that impair his ability to return home independently or with assist from family as needed.  Patient was encouraged to continue ambulating in the room every couple hours while here at the hospital.  Patient will be discharged from skilled physical therapy services at this time.   Outcome Measures     Row Name 12/07/21 1600             How much help from another person do you currently need...    Turning from your back to your side while in flat bed without using bedrails? 4  -CS      Moving from lying on back to sitting on the side of a flat bed without bedrails? 4  -CS      Moving to and from a bed to a chair (including a wheelchair)? 4  -CS      Standing up from a chair using your arms (e.g., wheelchair, bedside chair)? 4  -CS      Climbing 3-5 steps with a railing? 4  -CS      To walk in hospital room? 4  -CS      AM-PAC 6 Clicks Score (PT) 24  -CS              Functional  Assessment    Outcome Measure Options AM-PAC 6 Clicks Basic Mobility (PT)  -CS            User Key  (r) = Recorded By, (t) = Taken By, (c) = Cosigned By    Initials Name Provider Type    Andreia Dent, PT Physical Therapist                 Time Calculation:    PT Charges     Row Name 12/07/21 1659             Time Calculation    PT Received On 12/07/21  -CS              Untimed Charges    PT Eval/Re-eval Minutes 22  -CS              Total Minutes    Untimed Charges Total Minutes 22  -CS       Total Minutes 22  -CS            User Key  (r) = Recorded By, (t) = Taken By, (c) = Cosigned By    Initials Name Provider Type    Andreia Dent, PT Physical Therapist              Therapy Charges for Today     Code Description Service Date Service Provider Modifiers Qty    14686525997 HC PT EVAL LOW COMPLEXITY 2 12/7/2021 Andreia Rouse PT GP 1          PT G-Codes  Outcome Measure Options: AM-PAC 6 Clicks Basic Mobility (PT)  AM-PAC 6 Clicks Score (PT): 24  AM-PAC 6 Clicks Score (OT): 24    Andreia Rouse PT  12/7/2021

## 2021-12-07 NOTE — PLAN OF CARE
Goal Outcome Evaluation:  Plan of Care Reviewed With: patient        Progress: improving  Outcome Summary: Patient presents with no physical limitations that impair his ability to return home independently or with assist from family as needed.  Patient was encouraged to continue ambulating in the room every couple hours while here at the hospital.  Patient will be discharged from skilled physical therapy services at this time.

## 2021-12-07 NOTE — PLAN OF CARE
Goal Outcome Evaluation:  Plan of Care Reviewed With: patient        Progress: no change  Outcome Summary: VSS. Patient remains weak. Needs encouragement to move.

## 2021-12-07 NOTE — PROGRESS NOTES
[facility]   Hospitalist Progress Note  Date: 2021  Patient Name: Mariano Benitez  : 1995  MRN: [id number]  Date of admission: 2021      Subjective   Subjective     Chief Complaint: Follow up for shortness of breath    Summary: 24 y/o M w HTN presented with worsening SOA. COVID + on . O2 sat 88% on room air, on 2L.     Interval Followup: No events overnight.  Patient O2 saturation dropped this morning.  His requirements have not increased to 4 L nasal cannula.  He says he does not feel short of breath currently.  He says he is having more productive cough.  He denies any fevers chills.  Says he has increased his oral intake.  He denies any current chest pain, chest pressure, palpitations.    Review of Systems  All systems reviewed and are negative except as above in HPI.    Objective   Objective     Vitals:   Temp:  [97.6 °F (36.4 °C)-98.7 °F (37.1 °C)] 97.8 °F (36.6 °C)  Heart Rate:  [] 95  Resp:  [18-22] 18  BP: (105-132)/(65-84) 132/75  Flow (L/min):  [2-4] 4  Physical Exam    Constitutional: Alert, awake, appears weak, acutely ill   Eyes: Pupils equal and reactive, no conjunctival injection   HENT: NCAT, moist mucous membranes   Neck: Supple, trachea midline   Respiratory: Rhonchi bilaterally, no wheezing, faint bibasilar crackles, diminished breath sounds, on nasal cannula   Cardiovascular: RRR, no murmurs, no pedal edema   Gastrointestinal: Positive bowel sounds, soft, nontender, nondistended   Musculoskeletal: No gross deformities, no clubbing or cyanosis to extremities   Neurologic: Follows commands, no focal deficits, moves all extremities   Skin: Warm and dry, no rashes     Result Review    Result Review:  I have personally reviewed the results from the time of this admission to 2021 09:25 EST and agree with these findings:  [x]  Laboratory ALT 77, . Ferritin 3,118. D Dimer 0.73  [x]  Microbiology  [x]  Radiology CT chest , no PE  [x]  EKG/Telemetry sinus  tachycardia  []  Cardiology/Vascular   []  Pathology  []  Old records  []  Other:    Assessment/Plan   Assessment / Plan     Assessment:  Active Hospital Problems    Diagnosis  POA   • **Pneumonia due to COVID-19 virus [U07.1, J12.82]  Yes   • Hypoxia [R09.02]  Yes   • Obesity (BMI 30-39.9) [E66.9]  Yes   • Hypertension [I10]  Yes   • Elevated d-dimer [R79.89]  Yes   • Transaminitis [R74.01]  Yes     Plan:  Continue enhanced airborne isolation  CT chest reviewed, no pulmonary embolus noted  Continue with Symbicort 2 puffs daily, added DuoNebs, bronchodilator protocol  Added incentive spirometer, flutter valve  Continue Decadron 6 mg daily, continue for total of 10 days therapy  Continue IV remdesivir, currently day 3, continue to monitor LFTs daily, recheck CMP  Continue with supplemental oxygen, wean as tolerated, maintain O2 saturation greater than 90%  Blood pressure remains well controlled, continue lisinopril 10 mg daily  Lovenox for DVT prophylaxis  Added physical therapy  Case management for discharge planning  Labs reviewed, image reviewed, medications reviewed, vital signs reviewed  Further recommendations pending clinical course      Discussed plan with RN.    DVT prophylaxis:  Medical DVT prophylaxis orders are present.    CODE STATUS:   Code Status (Patient has no pulse and is not breathing): CPR (Attempt to Resuscitate)  Medical Interventions (Patient has pulse or is breathing): Full Support        Electronically signed by Rodriguez Celestin DO, 12/07/21, 9:25 AM EST.

## 2021-12-07 NOTE — PLAN OF CARE
Goal Outcome Evaluation:  Plan of Care Reviewed With: patient        Progress: improving  Outcome Summary: Patient currently at a independent level with all self-care and functional transfers with motivation needed to get out of bed,  walk, and sit up in the chair.  At this time, no additional occupational therapy is recommended.

## 2021-12-08 LAB
ALBUMIN SERPL-MCNC: 3.5 G/DL (ref 3.5–5.2)
ALBUMIN/GLOB SERPL: 1.2 G/DL
ALP SERPL-CCNC: 87 U/L (ref 39–117)
ALT SERPL W P-5'-P-CCNC: 172 U/L (ref 1–41)
ANION GAP SERPL CALCULATED.3IONS-SCNC: 11.2 MMOL/L (ref 5–15)
AST SERPL-CCNC: 275 U/L (ref 1–40)
BASOPHILS # BLD AUTO: 0.01 10*3/MM3 (ref 0–0.2)
BASOPHILS NFR BLD AUTO: 0.2 % (ref 0–1.5)
BILIRUB SERPL-MCNC: 0.8 MG/DL (ref 0–1.2)
BUN SERPL-MCNC: 19 MG/DL (ref 6–20)
BUN/CREAT SERPL: 25.7 (ref 7–25)
CALCIUM SPEC-SCNC: 8.8 MG/DL (ref 8.6–10.5)
CHLORIDE SERPL-SCNC: 104 MMOL/L (ref 98–107)
CO2 SERPL-SCNC: 22.8 MMOL/L (ref 22–29)
CREAT SERPL-MCNC: 0.74 MG/DL (ref 0.76–1.27)
DEPRECATED RDW RBC AUTO: 37.9 FL (ref 37–54)
EOSINOPHIL # BLD AUTO: 0 10*3/MM3 (ref 0–0.4)
EOSINOPHIL NFR BLD AUTO: 0 % (ref 0.3–6.2)
ERYTHROCYTE [DISTWIDTH] IN BLOOD BY AUTOMATED COUNT: 12.5 % (ref 12.3–15.4)
GFR SERPL CREATININE-BSD FRML MDRD: 127 ML/MIN/1.73
GLOBULIN UR ELPH-MCNC: 2.9 GM/DL
GLUCOSE SERPL-MCNC: 106 MG/DL (ref 65–99)
HCT VFR BLD AUTO: 37 % (ref 37.5–51)
HGB BLD-MCNC: 12.8 G/DL (ref 13–17.7)
IMM GRANULOCYTES # BLD AUTO: 0.03 10*3/MM3 (ref 0–0.05)
IMM GRANULOCYTES NFR BLD AUTO: 0.5 % (ref 0–0.5)
LYMPHOCYTES # BLD AUTO: 1.09 10*3/MM3 (ref 0.7–3.1)
LYMPHOCYTES NFR BLD AUTO: 19.1 % (ref 19.6–45.3)
MAGNESIUM SERPL-MCNC: 2.3 MG/DL (ref 1.6–2.6)
MCH RBC QN AUTO: 28.8 PG (ref 26.6–33)
MCHC RBC AUTO-ENTMCNC: 34.6 G/DL (ref 31.5–35.7)
MCV RBC AUTO: 83.3 FL (ref 79–97)
MONOCYTES # BLD AUTO: 0.56 10*3/MM3 (ref 0.1–0.9)
MONOCYTES NFR BLD AUTO: 9.8 % (ref 5–12)
NEUTROPHILS NFR BLD AUTO: 4.03 10*3/MM3 (ref 1.7–7)
NEUTROPHILS NFR BLD AUTO: 70.4 % (ref 42.7–76)
NRBC BLD AUTO-RTO: 0 /100 WBC (ref 0–0.2)
PLATELET # BLD AUTO: 220 10*3/MM3 (ref 140–450)
PMV BLD AUTO: 10.1 FL (ref 6–12)
POTASSIUM SERPL-SCNC: 3.9 MMOL/L (ref 3.5–5.2)
PROT SERPL-MCNC: 6.4 G/DL (ref 6–8.5)
RBC # BLD AUTO: 4.44 10*6/MM3 (ref 4.14–5.8)
SODIUM SERPL-SCNC: 138 MMOL/L (ref 136–145)
WBC NRBC COR # BLD: 5.72 10*3/MM3 (ref 3.4–10.8)

## 2021-12-08 PROCEDURE — 94799 UNLISTED PULMONARY SVC/PX: CPT

## 2021-12-08 PROCEDURE — 94760 N-INVAS EAR/PLS OXIMETRY 1: CPT

## 2021-12-08 PROCEDURE — 25010000002 FUROSEMIDE PER 20 MG: Performed by: STUDENT IN AN ORGANIZED HEALTH CARE EDUCATION/TRAINING PROGRAM

## 2021-12-08 PROCEDURE — 80053 COMPREHEN METABOLIC PANEL: CPT | Performed by: STUDENT IN AN ORGANIZED HEALTH CARE EDUCATION/TRAINING PROGRAM

## 2021-12-08 PROCEDURE — 83735 ASSAY OF MAGNESIUM: CPT | Performed by: STUDENT IN AN ORGANIZED HEALTH CARE EDUCATION/TRAINING PROGRAM

## 2021-12-08 PROCEDURE — 85025 COMPLETE CBC W/AUTO DIFF WBC: CPT | Performed by: STUDENT IN AN ORGANIZED HEALTH CARE EDUCATION/TRAINING PROGRAM

## 2021-12-08 PROCEDURE — 25010000002 DEXAMETHASONE PER 1 MG: Performed by: INTERNAL MEDICINE

## 2021-12-08 PROCEDURE — 25010000002 ENOXAPARIN PER 10 MG: Performed by: INTERNAL MEDICINE

## 2021-12-08 PROCEDURE — 99233 SBSQ HOSP IP/OBS HIGH 50: CPT | Performed by: STUDENT IN AN ORGANIZED HEALTH CARE EDUCATION/TRAINING PROGRAM

## 2021-12-08 RX ORDER — IPRATROPIUM BROMIDE AND ALBUTEROL SULFATE 2.5; .5 MG/3ML; MG/3ML
3 SOLUTION RESPIRATORY (INHALATION) EVERY 4 HOURS PRN
Status: DISCONTINUED | OUTPATIENT
Start: 2021-12-08 | End: 2021-12-09 | Stop reason: HOSPADM

## 2021-12-08 RX ORDER — FUROSEMIDE 10 MG/ML
40 INJECTION INTRAMUSCULAR; INTRAVENOUS ONCE
Status: COMPLETED | OUTPATIENT
Start: 2021-12-08 | End: 2021-12-08

## 2021-12-08 RX ADMIN — SODIUM CHLORIDE, PRESERVATIVE FREE 10 ML: 5 INJECTION INTRAVENOUS at 21:58

## 2021-12-08 RX ADMIN — LISINOPRIL 10 MG: 10 TABLET ORAL at 09:12

## 2021-12-08 RX ADMIN — DEXAMETHASONE SODIUM PHOSPHATE 6 MG: 10 INJECTION INTRAMUSCULAR; INTRAVENOUS at 09:11

## 2021-12-08 RX ADMIN — ENOXAPARIN SODIUM 40 MG: 40 INJECTION SUBCUTANEOUS at 21:58

## 2021-12-08 RX ADMIN — IPRATROPIUM BROMIDE AND ALBUTEROL SULFATE 3 ML: .5; 2.5 SOLUTION RESPIRATORY (INHALATION) at 06:49

## 2021-12-08 RX ADMIN — FUROSEMIDE 40 MG: 10 INJECTION, SOLUTION INTRAVENOUS at 09:12

## 2021-12-08 RX ADMIN — BUDESONIDE AND FORMOTEROL FUMARATE DIHYDRATE 2 PUFF: 160; 4.5 AEROSOL RESPIRATORY (INHALATION) at 06:52

## 2021-12-08 RX ADMIN — BUDESONIDE AND FORMOTEROL FUMARATE DIHYDRATE 2 PUFF: 160; 4.5 AEROSOL RESPIRATORY (INHALATION) at 18:57

## 2021-12-08 RX ADMIN — IPRATROPIUM BROMIDE AND ALBUTEROL SULFATE 3 ML: .5; 2.5 SOLUTION RESPIRATORY (INHALATION) at 02:59

## 2021-12-08 RX ADMIN — SODIUM CHLORIDE, PRESERVATIVE FREE 10 ML: 5 INJECTION INTRAVENOUS at 09:12

## 2021-12-08 NOTE — SIGNIFICANT NOTE
12/08/21 1027   Plan   Plan Comments SW spoke with pt regarding discharge plan. PT is recommending outpt therapy. Pt unsure that he needs that. Pt reported SOB when up and moving but that is all. Pt stated he would be going to stay with his mom for a bit once he is discharged. SW updated moms address in chart. Pt will likely need home oxygen set up and will need the correct address of where pt is going. SW will continue to follow for discharge needs.

## 2021-12-08 NOTE — PLAN OF CARE
Goal Outcome Evaluation:  Plan of Care Reviewed With: patient        Progress: declining  Outcome Summary: Patient went from 2L to 4L/NC. Educated patient to walk around, sleep on sides, and belly as possible and to deep breathe. Vital signs stable. See discharge summary for further details.

## 2021-12-08 NOTE — SIGNIFICANT NOTE
12/08/21 1100   Coping/Psychosocial   Observed Emotional State flat   Verbalized Emotional State hopelessness; loneliness; sadness   Trust Relationship/Rapport reassurance provided; questions encouraged   Additional Documentation Spiritual Care (Group)   Spiritual Care   Spiritual Care Source  initiative  (daily rounding)   Spiritual Care Follow-Up will follow closely   Response to Spiritual Care visit timing not optimal (follow-up planned)   Spiritual Care Visit Type initial   Spiritual Care Request hospitality support  (introductions made and my role explained)   Receptivity to Spiritual Care other (see comments)  (pt indifferent)   At time of visit pt is in covid iso on 3NT

## 2021-12-08 NOTE — PLAN OF CARE
Goal Outcome Evaluation:  Plan of Care Reviewed With: patient         Pt sat up in chair this shift. States he has a productive cough. Generalized weakness noted.

## 2021-12-08 NOTE — PROGRESS NOTES
[facility]   Hospitalist Progress Note  Date: 2021  Patient Name: Mariano Benitez  : 1995  MRN: [id number]  Date of admission: 2021      Subjective   Subjective     Chief Complaint: Follow up for shortness of breath    Summary: 26 y/o M w HTN presented with worsening SOA. COVID + on . O2 sat 88% on room air, on 2L.     Interval Followup: No events overnight.  Patient increased on his supplemental oxygen this morning.  He tells me that he is still having an intermittent cough.  He does appear dyspneic on exertion.  He says he is trying to increase his oral intake.  He has not had any episodes of nausea or emesis.  He has not had any fevers or chills.  Patient received Lasix this morning and is diuresing well.  He notes he is using his flutter valve.    Review of Systems  All systems reviewed and are negative except as above in HPI.    Objective   Objective     Vitals:   Temp:  [97.6 °F (36.4 °C)-98.6 °F (37 °C)] 98.4 °F (36.9 °C)  Heart Rate:  [] 78  Resp:  [18-20] 20  BP: (107-127)/(73-84) 122/81  Flow (L/min):  [3-4] 3  Physical Exam    Constitutional: Alert, awake, acutely ill   Eyes: Pupils equal and reactive, no conjunctival injection   HENT: NCAT, moist mucous membranes   Neck: Supple, trachea midline, no JVD   Respiratory: Rhonchi bilaterally, no wheezing, faint bibasilar crackles, diminished breath sounds, on nasal cannula   Cardiovascular: RRR, no murmurs, no pedal edema    Gastrointestinal: Positive bowel sounds, soft, nontender, nondistended   Musculoskeletal: No gross deformities, no clubbing or cyanosis to extremities   Neurologic: Follows commands, no focal deficits, moves all extremities   Skin: Warm and dry, no rashes     Result Review    Result Review:  I have personally reviewed the results from the time of this admission to 2021 08:26 EST and agree with these findings:  [x]  Laboratory   [x]  Microbiology  [x]  Radiology CT chest , no PE  [x]   EKG/Telemetry  []  Cardiology/Vascular   []  Pathology  []  Old records  []  Other:    Assessment/Plan   Assessment / Plan     Assessment:  Active Hospital Problems    Diagnosis  POA   • **Pneumonia due to COVID-19 virus [U07.1, J12.82]  Yes   • Hypoxia [R09.02]  Yes   • Obesity (BMI 30-39.9) [E66.9]  Yes   • Hypertension [I10]  Yes   • Elevated d-dimer [R79.89]  Yes   • Transaminitis [R74.01]  Yes     Plan:  Continue enhanced airborne isolation  Continue with supplemental oxygen, wean as tolerated, maintain O2 saturation greater than 90%  Continue with Symbicort 2 puffs daily, DuoNebs, bronchodilator protocol  Encourage use of incentive spirometer, flutter valve  Continue Decadron 6 mg daily, continue for total of 10 days therapy  Patient LFTs significantly increased, will hold off on further remdesivir infusion, recheck CMP in a.m.  We will give dose of Lasix today, continue to monitor I's and O's, daily weights  Blood pressure remains controlled on lisinopril daily  Lovenox for DVT prophylaxis  Physical therapy assistance appreciated this patient  Case management following, patient would likely need oxygen and nebulizer machine at discharge  Labs reviewed, image reviewed, medications reviewed, vital signs reviewed  Further recommendations pending clinical course      Discussed plan with RN.    DVT prophylaxis:  Medical DVT prophylaxis orders are present.    CODE STATUS:   Code Status (Patient has no pulse and is not breathing): CPR (Attempt to Resuscitate)  Medical Interventions (Patient has pulse or is breathing): Full Support        Electronically signed by Rodriguez Celestin DO, 12/08/21, 8:26 AM EST.

## 2021-12-09 VITALS
SYSTOLIC BLOOD PRESSURE: 117 MMHG | WEIGHT: 223.11 LBS | TEMPERATURE: 98.1 F | HEIGHT: 72 IN | OXYGEN SATURATION: 96 % | DIASTOLIC BLOOD PRESSURE: 70 MMHG | RESPIRATION RATE: 20 BRPM | BODY MASS INDEX: 30.22 KG/M2 | HEART RATE: 103 BPM

## 2021-12-09 PROBLEM — D89.831 CYTOKINE RELEASE SYNDROME, GRADE 1: Status: ACTIVE | Noted: 2021-12-09

## 2021-12-09 LAB
ALBUMIN SERPL-MCNC: 3.4 G/DL (ref 3.5–5.2)
ALBUMIN/GLOB SERPL: 1.1 G/DL
ALP SERPL-CCNC: 87 U/L (ref 39–117)
ALT SERPL W P-5'-P-CCNC: 250 U/L (ref 1–41)
ANION GAP SERPL CALCULATED.3IONS-SCNC: 11.5 MMOL/L (ref 5–15)
AST SERPL-CCNC: 275 U/L (ref 1–40)
BASOPHILS # BLD AUTO: 0.01 10*3/MM3 (ref 0–0.2)
BASOPHILS NFR BLD AUTO: 0.2 % (ref 0–1.5)
BILIRUB SERPL-MCNC: 0.9 MG/DL (ref 0–1.2)
BUN SERPL-MCNC: 23 MG/DL (ref 6–20)
BUN/CREAT SERPL: 26.1 (ref 7–25)
CALCIUM SPEC-SCNC: 9.1 MG/DL (ref 8.6–10.5)
CHLORIDE SERPL-SCNC: 103 MMOL/L (ref 98–107)
CO2 SERPL-SCNC: 23.5 MMOL/L (ref 22–29)
CREAT SERPL-MCNC: 0.88 MG/DL (ref 0.76–1.27)
DEPRECATED RDW RBC AUTO: 38.1 FL (ref 37–54)
EOSINOPHIL # BLD AUTO: 0.01 10*3/MM3 (ref 0–0.4)
EOSINOPHIL NFR BLD AUTO: 0.2 % (ref 0.3–6.2)
ERYTHROCYTE [DISTWIDTH] IN BLOOD BY AUTOMATED COUNT: 12.5 % (ref 12.3–15.4)
GFR SERPL CREATININE-BSD FRML MDRD: 104 ML/MIN/1.73
GLOBULIN UR ELPH-MCNC: 3.2 GM/DL
GLUCOSE SERPL-MCNC: 107 MG/DL (ref 65–99)
HCT VFR BLD AUTO: 39.5 % (ref 37.5–51)
HGB BLD-MCNC: 13.7 G/DL (ref 13–17.7)
IMM GRANULOCYTES # BLD AUTO: 0.04 10*3/MM3 (ref 0–0.05)
IMM GRANULOCYTES NFR BLD AUTO: 0.6 % (ref 0–0.5)
LYMPHOCYTES # BLD AUTO: 1.3 10*3/MM3 (ref 0.7–3.1)
LYMPHOCYTES NFR BLD AUTO: 19.5 % (ref 19.6–45.3)
MAGNESIUM SERPL-MCNC: 2.3 MG/DL (ref 1.6–2.6)
MCH RBC QN AUTO: 29 PG (ref 26.6–33)
MCHC RBC AUTO-ENTMCNC: 34.7 G/DL (ref 31.5–35.7)
MCV RBC AUTO: 83.5 FL (ref 79–97)
MONOCYTES # BLD AUTO: 0.82 10*3/MM3 (ref 0.1–0.9)
MONOCYTES NFR BLD AUTO: 12.3 % (ref 5–12)
NEUTROPHILS NFR BLD AUTO: 4.48 10*3/MM3 (ref 1.7–7)
NEUTROPHILS NFR BLD AUTO: 67.2 % (ref 42.7–76)
NRBC BLD AUTO-RTO: 0 /100 WBC (ref 0–0.2)
PLATELET # BLD AUTO: 248 10*3/MM3 (ref 140–450)
PMV BLD AUTO: 9.7 FL (ref 6–12)
POTASSIUM SERPL-SCNC: 3.9 MMOL/L (ref 3.5–5.2)
PROT SERPL-MCNC: 6.6 G/DL (ref 6–8.5)
RBC # BLD AUTO: 4.73 10*6/MM3 (ref 4.14–5.8)
SODIUM SERPL-SCNC: 138 MMOL/L (ref 136–145)
WBC NRBC COR # BLD: 6.66 10*3/MM3 (ref 3.4–10.8)

## 2021-12-09 PROCEDURE — 94799 UNLISTED PULMONARY SVC/PX: CPT

## 2021-12-09 PROCEDURE — 83735 ASSAY OF MAGNESIUM: CPT | Performed by: STUDENT IN AN ORGANIZED HEALTH CARE EDUCATION/TRAINING PROGRAM

## 2021-12-09 PROCEDURE — 25010000002 DEXAMETHASONE PER 1 MG: Performed by: INTERNAL MEDICINE

## 2021-12-09 PROCEDURE — 80053 COMPREHEN METABOLIC PANEL: CPT | Performed by: STUDENT IN AN ORGANIZED HEALTH CARE EDUCATION/TRAINING PROGRAM

## 2021-12-09 PROCEDURE — 94760 N-INVAS EAR/PLS OXIMETRY 1: CPT

## 2021-12-09 PROCEDURE — 85025 COMPLETE CBC W/AUTO DIFF WBC: CPT | Performed by: STUDENT IN AN ORGANIZED HEALTH CARE EDUCATION/TRAINING PROGRAM

## 2021-12-09 PROCEDURE — 99239 HOSP IP/OBS DSCHRG MGMT >30: CPT | Performed by: STUDENT IN AN ORGANIZED HEALTH CARE EDUCATION/TRAINING PROGRAM

## 2021-12-09 RX ORDER — DEXAMETHASONE 6 MG/1
6 TABLET ORAL DAILY
Qty: 6 TABLET | Refills: 0 | Status: SHIPPED | OUTPATIENT
Start: 2021-12-09 | End: 2021-12-15

## 2021-12-09 RX ORDER — BUDESONIDE AND FORMOTEROL FUMARATE DIHYDRATE 160; 4.5 UG/1; UG/1
2 AEROSOL RESPIRATORY (INHALATION)
Qty: 6 G | Refills: 0 | Status: SHIPPED | OUTPATIENT
Start: 2021-12-09 | End: 2022-01-08

## 2021-12-09 RX ORDER — IPRATROPIUM BROMIDE AND ALBUTEROL SULFATE 2.5; .5 MG/3ML; MG/3ML
3 SOLUTION RESPIRATORY (INHALATION) EVERY 4 HOURS PRN
Qty: 360 ML | Refills: 0 | Status: SHIPPED | OUTPATIENT
Start: 2021-12-09 | End: 2022-01-08

## 2021-12-09 RX ADMIN — BUDESONIDE AND FORMOTEROL FUMARATE DIHYDRATE 2 PUFF: 160; 4.5 AEROSOL RESPIRATORY (INHALATION) at 08:32

## 2021-12-09 RX ADMIN — LISINOPRIL 10 MG: 10 TABLET ORAL at 08:14

## 2021-12-09 RX ADMIN — DEXAMETHASONE SODIUM PHOSPHATE 6 MG: 10 INJECTION INTRAMUSCULAR; INTRAVENOUS at 08:14

## 2021-12-09 RX ADMIN — SODIUM CHLORIDE, PRESERVATIVE FREE 10 ML: 5 INJECTION INTRAVENOUS at 08:14

## 2021-12-09 NOTE — DISCHARGE SUMMARY
[facility]         HOSPITALIST  DISCHARGE SUMMARY    Patient Name: Mariano Benitez  : 1995  MRN: [id number]    Date of Admission: 2021  Date of Discharge: 21  Primary Care Physician: Calvin Chirinos APRN    Consults     Date and Time Order Name Status Description    2021  7:17 PM Hospitalist (on-call MD unless specified)            Active and Resolved Hospital Problems:  Active Hospital Problems    Diagnosis POA   • **Pneumonia due to COVID-19 virus [U07.1, J12.82] Yes   • Cytokine release syndrome, grade 1 [D89.831] No   • Hypoxia [R09.02] Yes   • Obesity (BMI 30-39.9) [E66.9] Yes   • Hypertension [I10] Yes   • Elevated d-dimer [R79.89] Yes   • Transaminitis [R74.01] Yes      Resolved Hospital Problems    Diagnosis POA   • Elevated lactic acid level [R79.89] Yes       Hospital Course     Hospital Course:  Mariano Benitez is a 25 y.o. male presented with chief complaint shortness of breath.  Patient subsequently positive for coronavirus on 2021.  Upon initial presentation he was noted to be 8% on room air and required submental oxygen to maintain his O2 saturation greater 90%.  Patient was started on remdesivir, Decadron, and was given bronchodilators.  Ultimately patient's respiratory status improved and he was weaned down to around 1.5 L nasal cannula prior to discharge.  An order was placed for patient to have supplemental oxygen as outpatient.  He was additionally given a nebulizer machine.  He will continue with Decadron for a total of 10 days therapy.  Patient did not require antibiotics as he did not have any evidence of bacterial pneumonia.  He will continue his other home medications as before.  He will follow-up with PCP 1 week.  The time of discharge patient had no chest pain, chest pressure, palpitations, fever, chills.  He was discharged in stable condition.      DISCHARGE Follow Up Recommendations for labs and diagnostics: PCP 1 week.      Day of Discharge      Vital Signs:  Temp:  [97.6 °F (36.4 °C)-98.3 °F (36.8 °C)] 97.6 °F (36.4 °C)  Heart Rate:  [] 100  Resp:  [16-24] 24  BP: (102-117)/(58-73) 107/63  Flow (L/min):  [2-2.5] 2  Physical Exam:   Constitutional: Alert, awake, no acute distress  HEENT:  PERRLA, EOMI; No Scleral icterus  Neck:  Supple; No Mass, No Lymphadenopathy  Cardiovascular:  No Rubs, No Edema, No JVD  Respiratory: No respiratory distress, unlabored breathing, saturating well on the cannula   Abdomen:  Normal BS all 4 Quadrants; No Guarding, No Tenderness  Musculoskeletal:  Pulses Positive all 4 Ext; No Cyanosis, No Edema  Neurological:  Alert+Ox3, Mental status WNL; No Dysarthria  Skin:  No Rash, No Cellulitis, No Erythema      Discharge Details        Discharge Medications      New Medications      Instructions Start Date   budesonide-formoterol 160-4.5 MCG/ACT inhaler  Commonly known as: SYMBICORT   2 puffs, Inhalation, 2 Times Daily - RT      dexamethasone 6 MG tablet  Commonly known as: DECADRON   6 mg, Oral, Daily      ipratropium-albuterol 0.5-2.5 mg/3 ml nebulizer  Commonly known as: DUO-NEB   3 mL, Nebulization, Every 4 Hours PRN         Continue These Medications      Instructions Start Date   benzonatate 200 MG capsule  Commonly known as: TESSALON   200 mg, Oral, 3 Times Daily PRN      fluticasone 50 MCG/ACT nasal spray  Commonly known as: FLONASE   2 sprays, Nasal, Daily      lisinopril 10 MG tablet  Commonly known as: PRINIVIL,ZESTRIL   10 mg, Oral, Nightly      sertraline 50 MG tablet  Commonly known as: ZOLOFT   100 mg, Oral, Nightly      topiramate 25 MG tablet  Commonly known as: TOPAMAX   25 mg, Oral, Nightly         Stop These Medications    AZITHROMYCIN PO     methylPREDNISolone 4 MG dose pack  Commonly known as: MEDROL            No Known Allergies    Discharge Disposition:  Home or Self Care    Diet:  Hospital:  Diet Order   Procedures   • Diet Regular       Discharge Activity:       CODE STATUS:  Code Status and  Medical Interventions:   Ordered at: 12/05/21 1944     Code Status (Patient has no pulse and is not breathing):    CPR (Attempt to Resuscitate)     Medical Interventions (Patient has pulse or is breathing):    Full Support         No future appointments.        Pertinent  and/or Most Recent Results     PROCEDURES:   XR Chest 1 View    Result Date: 12/5/2021  Narrative: PROCEDURE: XR CHEST 1 VW  COMPARISON: None  INDICATIONS: SOA Triage Protocol  FINDINGS:  The heart size is borderline enlarged.  There are patchy airspace opacities throughout the bilateral midlung and lower lung zones greater on the right than the left.  There is also right upper lobe infiltrate.  CONCLUSION: Multifocal pneumonia       MANUELA DE LOS SANTOS MD       Electronically Signed and Approved By: MANUELA DE LOS SANTOS MD on 12/05/2021 at 17:17             CT Chest Pulmonary Embolism    Result Date: 12/6/2021  Narrative: PROCEDURE: CT CHEST PULMONARY EMBOLISM W CONTRAST  COMPARISON:  None INDICATIONS: hypoxia, COVID, elevated D dimer  TECHNIQUE: After obtaining the patient's consent, CT images were obtained with non-ionic intravenous contrast material.   PROTOCOL:   Pulmonary embolism imaging protocol performed    RADIATION:   DLP: 228mGy*cm   Automated exposure control was utilized to minimize radiation dose. CONTRAST: 100cc Isovue 370 I.V.  FINDINGS:  There is excellent pulmonary arterial opacification.  There are no filling defects to suggest pulmonary embolic disease.  There are diffuse bilateral alveolar airspace opacities some of which are rounded and ground-glass and are more peripherally located.  The opacities are greatest in the lower lungs and greater on the right than the left.  The findings are consistent with multifocal pneumonia.  This pattern would be consistent with COVID-19 pneumonia.  There is diffuse fatty infiltration of the liver.  There are no focal liver lesions.  The other upper abdominal structures are within normal limits.   CONCLUSION:  1. No evidence of pulmonary embolic disease. 2. Diffuse bilateral pneumonia greater on the right than the left. 3. Fatty infiltration of the liver.     MANUELA DE LOS SANTOS MD       Electronically Signed and Approved By: MANUELA DE LOS SANTOS MD on 12/06/2021 at 14:31                 LAB RESULTS:      Lab 12/09/21  0517 12/08/21  0624 12/07/21  0642 12/06/21  1042 12/05/21  2130 12/05/21  1811 12/05/21  1653   WBC 6.66 5.72 4.77 3.87  --   --  3.73   HEMOGLOBIN 13.7 12.8* 14.3 13.6  --   --  14.9   HEMATOCRIT 39.5 37.0* 42.1 38.5  --   --  43.6   PLATELETS 248 220 189 161  --   --  172   NEUTROS ABS 4.48 4.03  --   --   --   --  2.89   IMMATURE GRANS (ABS) 0.04 0.03  --   --   --   --  0.01   LYMPHS ABS 1.30 1.09  --   --   --   --  0.53*   MONOS ABS 0.82 0.56  --   --   --   --  0.30   EOS ABS 0.01 0.00  --   --   --   --  0.00   MCV 83.5 83.3 84.2 81.7  --   --  84.7   CRP  --   --   --   --   --   --  3.51*   PROCALCITONIN  --   --   --   --   --   --  0.22   LACTATE  --   --   --   --  1.8 2.4*  --    LDH  --   --   --   --   --   --  501*         Lab 12/09/21  0517 12/08/21  0624 12/07/21  0642 12/06/21  1043 12/05/21  1653   SODIUM 138 138 139 133* 135*   POTASSIUM 3.9 3.9 4.2 3.5 3.7   CHLORIDE 103 104 103 99 95*   CO2 23.5 22.8 24.1 22.8 26.4   ANION GAP 11.5 11.2 11.9 11.2 13.6   BUN 23* 19 20 15 18   CREATININE 0.88 0.74* 0.99 0.86 1.22   GLUCOSE 107* 106* 114* 108* 128*   CALCIUM 9.1 8.8 9.3 8.6 9.1   MAGNESIUM 2.3 2.3  --   --   --          Lab 12/09/21  0517 12/08/21  0624 12/07/21  0642 12/06/21  1043 12/05/21  1653   TOTAL PROTEIN 6.6 6.4 7.3 6.8 7.6   ALBUMIN 3.40* 3.50 3.70 3.40* 4.10   GLOBULIN 3.2 2.9 3.6 3.4 3.5   ALT (SGPT) 250* 172* 64* 66* 77*   AST (SGOT) 275* 275* 77* 107* 130*   BILIRUBIN 0.9 0.8 0.6 0.9 1.0   ALK PHOS 87 87 84 82 86         Lab 12/05/21  1653   PROBNP 72.0   TROPONIN T <0.010             Lab 12/05/21  1653   FERRITIN 3,118.00*         Lab 12/05/21  1833   PH, ARTERIAL 7.509*    PCO2, ARTERIAL 29.5*   PO2 ART 59.0*   O2 SATURATION ART 91.8*   HCO3 ART 23.0   BASE EXCESS ART 1.0   CARBOXYHEMOGLOBIN 0.5     Brief Urine Lab Results     None        Microbiology Results (last 10 days)     Procedure Component Value - Date/Time    Blood Culture - Blood, Arm, Left [[id number]]  (Normal) Collected: 12/05/21 2135    Lab Status: Preliminary result Specimen: Blood from Arm, Left Updated: 12/08/21 2145     Blood Culture No growth at 3 days    Blood Culture - Blood, Arm, Left [[id number]]  (Normal) Collected: 12/05/21 2130    Lab Status: Preliminary result Specimen: Blood from Arm, Left Updated: 12/08/21 2145     Blood Culture No growth at 3 days                           Labs Pending at Discharge:  Pending Labs     Order Current Status    Blood Culture - Blood, Arm, Left Preliminary result    Blood Culture - Blood, Arm, Left Preliminary result            Time spent on Discharge including face to face service:  32 minutes    Electronically signed by Rodriguez Celestin DO, 12/09/21, 2:03 PM EST.

## 2021-12-09 NOTE — NURSING NOTE
Exercise Oximetry    Patient Name:Mariano Benitez   MRN: [id number]   Date: 12/09/21             ROOM AIR BASELINE   SpO2% 96   Heart Rate 113   Blood Pressure      EXERCISE ON ROOM AIR SpO2% EXERCISE ON O2 @  LPM SpO2%   1 MINUTE 93 1 MINUTE NA   2 MINUTES 92 2 MINUTES NA   3 MINUTES 89 3 MINUTES NA   4 MINUTES 90 4 MINUTES NA   5 MINUTES 90 5 MINUTES NA   6 MINUTES 88 6 MINUTES NA              Distance Walked   Distance Walked   Dyspnea (Km Scale)   Dyspnea (Km Scale)   Fatigue (Km Scale)   Fatigue (Km Scale)   SpO2% Post Exercise  89% SpO2% Post Exercise   HR Post Exercise  121 HR Post Exercise   Time to Recovery   Time to Recovery     Comments:

## 2021-12-09 NOTE — SIGNIFICANT NOTE
12/09/21 1400   Plan   Plan Comments Per Nurse pt will discharge today per MD. Walk test will be ordered. Pt stated his brother is here as well in 3012 and they will be leaving together as family will be transporting them home. Pt reported he will be going to stay with his mom. SW sent referral to [facility] for home oxygen and nebulizer machine.   Final Discharge Disposition Code 01 - home or self-care

## 2021-12-09 NOTE — PLAN OF CARE
Goal Outcome Evaluation:              Outcome Summary: VSS on 2L n/c. No complaints this evening, patient hopeing to go home today.

## 2021-12-09 NOTE — SIGNIFICANT NOTE
12/09/21 1536   Plan   Final Note Pt failed walk test. Pt given O2 and neb from [facility] and pulse ox. Dc medications received at pharmacy. Primary rn asked to give pt Symbicort that was being used as inpt.

## 2021-12-09 NOTE — NURSING NOTE
Exercise Oximetry    Patient Name:Mariano Benitez   MRN: [id number]   Date: 12/09/21             ROOM AIR BASELINE   SpO2% 93%   Heart Rate 113   Blood Pressure      EXERCISE ON ROOM AIR SpO2% EXERCISE ON O2 @ 1 LPM SpO2%   1 MINUTE 93 1 MINUTE 89   2 MINUTES 92 2 MINUTES 90   3 MINUTES 89 3 MINUTES 90   4 MINUTES 90 4 MINUTES 90   5 MINUTES 90 5 MINUTES 90   6 MINUTES 88 6 MINUTES 89              Distance Walked   Distance Walked   Dyspnea (Km Scale)   Dyspnea (Km Scale)   Fatigue (Km Scale)   Fatigue (Km Scale)   SpO2% Post Exercise  SpO2% Post Exercise   HR Post Exercise   HR Post Exercise   Time to Recovery   Time to Recovery     Comments:

## 2021-12-09 NOTE — PLAN OF CARE
Goal Outcome Evaluation:           Progress: no change    Patient to discharge home, self care with O2, pulse ox and neb machine. Will leave by private vehicle with family.

## 2021-12-10 ENCOUNTER — READMISSION MANAGEMENT (OUTPATIENT)
Dept: CALL CENTER | Facility: HOSPITAL | Age: 26
End: 2021-12-10

## 2021-12-10 LAB
BACTERIA SPEC AEROBE CULT: NORMAL
BACTERIA SPEC AEROBE CULT: NORMAL

## 2021-12-10 NOTE — OUTREACH NOTE
COVID-19 Week 1 Survey      Responses   [facility] patient discharged from? [facility]   Does the patient have one of the following disease processes/diagnoses(primary or secondary)? COVID-19   COVID-19 underlying condition? None   Call Number Call 1   Week 1 Call successful? No   Discharge diagnosis Pneumonia due to COVID-19 virus           Viky Guo RN

## 2021-12-10 NOTE — OUTREACH NOTE
Prep Survey      Responses   [facility]? [facility]   Is LACE score < 7 ? No   Emergency Room discharge w/ pulse ox? No   Eligibility Readm Mgmt   Discharge diagnosis Pneumonia due to COVID-19 virus    Does the patient have one of the following disease processes/diagnoses(primary or secondary)? COVID-19   Does the patient have Home health ordered? No   Is there a DME ordered? Yes   What DME was ordered? O2 and nebulizer-[facility]   Prep survey completed? Yes          Carmita Negron RN

## 2021-12-11 ENCOUNTER — READMISSION MANAGEMENT (OUTPATIENT)
Dept: CALL CENTER | Facility: HOSPITAL | Age: 26
End: 2021-12-11

## 2021-12-11 NOTE — OUTREACH NOTE
COVID-19 Week 1 Survey      Responses   [facility] patient discharged from? [facility]   Does the patient have one of the following disease processes/diagnoses(primary or secondary)? COVID-19   COVID-19 underlying condition? None   Call Number Call 2   Week 1 Call successful? No   Discharge diagnosis Pneumonia due to COVID-19 virus           Christina Hurst RN

## 2021-12-12 ENCOUNTER — READMISSION MANAGEMENT (OUTPATIENT)
Dept: CALL CENTER | Facility: HOSPITAL | Age: 26
End: 2021-12-12

## 2021-12-12 NOTE — OUTREACH NOTE
COVID-19 Week 1 Survey      Responses   [facility] patient discharged from? [facility]   Does the patient have one of the following disease processes/diagnoses(primary or secondary)? COVID-19   COVID-19 underlying condition? None   Call Number Call 3   Week 1 Call successful? No   Discharge diagnosis Pneumonia due to COVID-19 virus           Bassam Khoury RN

## 2021-12-18 LAB — QT INTERVAL: 301 MS
